# Patient Record
Sex: FEMALE | Race: OTHER
[De-identification: names, ages, dates, MRNs, and addresses within clinical notes are randomized per-mention and may not be internally consistent; named-entity substitution may affect disease eponyms.]

---

## 2017-10-27 ENCOUNTER — HOSPITAL ENCOUNTER (EMERGENCY)
Dept: HOSPITAL 72 - EMR | Age: 62
LOS: 1 days | Discharge: HOME | End: 2017-10-28
Payer: MEDICAID

## 2017-10-27 VITALS — DIASTOLIC BLOOD PRESSURE: 82 MMHG | SYSTOLIC BLOOD PRESSURE: 138 MMHG

## 2017-10-27 VITALS — HEIGHT: 61 IN | WEIGHT: 197 LBS | BODY MASS INDEX: 37.19 KG/M2

## 2017-10-27 DIAGNOSIS — R10.32: Primary | ICD-10-CM

## 2017-10-27 DIAGNOSIS — N20.0: ICD-10-CM

## 2017-10-27 DIAGNOSIS — Z98.1: ICD-10-CM

## 2017-10-27 DIAGNOSIS — F17.200: ICD-10-CM

## 2017-10-27 PROCEDURE — 83690 ASSAY OF LIPASE: CPT

## 2017-10-27 PROCEDURE — 80053 COMPREHEN METABOLIC PANEL: CPT

## 2017-10-27 PROCEDURE — 85025 COMPLETE CBC W/AUTO DIFF WBC: CPT

## 2017-10-27 PROCEDURE — 96361 HYDRATE IV INFUSION ADD-ON: CPT

## 2017-10-27 PROCEDURE — 96375 TX/PRO/DX INJ NEW DRUG ADDON: CPT

## 2017-10-27 PROCEDURE — 96374 THER/PROPH/DIAG INJ IV PUSH: CPT

## 2017-10-27 PROCEDURE — 36415 COLL VENOUS BLD VENIPUNCTURE: CPT

## 2017-10-27 PROCEDURE — 74176 CT ABD & PELVIS W/O CONTRAST: CPT

## 2017-10-27 PROCEDURE — 81003 URINALYSIS AUTO W/O SCOPE: CPT

## 2017-10-27 PROCEDURE — 99284 EMERGENCY DEPT VISIT MOD MDM: CPT

## 2017-10-28 VITALS — DIASTOLIC BLOOD PRESSURE: 82 MMHG | SYSTOLIC BLOOD PRESSURE: 138 MMHG

## 2017-10-28 LAB
ALBUMIN/GLOB SERPL: 1.2 {RATIO} (ref 1–2.7)
ALT SERPL-CCNC: 29 U/L (ref 12–78)
ANION GAP SERPL CALC-SCNC: 8 MMOL/L (ref 5–15)
APPEARANCE UR: CLEAR
AST SERPL-CCNC: 19 U/L (ref 15–37)
BASOPHILS NFR BLD AUTO: 1.4 % (ref 0–2)
CALCIUM SERPL-MCNC: 9.3 MG/DL (ref 8.5–10.1)
CHLORIDE SERPL-SCNC: 102 MMOL/L (ref 98–107)
CO2 SERPL-SCNC: 32 MMOL/L (ref 21–32)
CREAT SERPL-MCNC: 0.8 MG/DL (ref 0.55–1.3)
EOSINOPHIL NFR BLD AUTO: 2.2 % (ref 0–3)
ERYTHROCYTE [DISTWIDTH] IN BLOOD BY AUTOMATED COUNT: 13.4 % (ref 11.6–14.8)
GFR SERPLBLD BASED ON 1.73 SQ M-ARVRAT: > 60 ML/MIN (ref 60–?)
GLOBULIN SER-MCNC: 3.3 G/DL
KETONES UR QL STRIP: NEGATIVE
LEUKOCYTE ESTERASE UR QL STRIP: NEGATIVE
LIPASE SERPL-CCNC: 214 U/L (ref 73–393)
LYMPHOCYTES NFR BLD AUTO: 40.8 % (ref 20–45)
MCH RBC QN AUTO: 32.5 PG (ref 27–31)
MCHC RBC AUTO-ENTMCNC: 33.9 G/DL (ref 32–36)
MCV RBC AUTO: 96 FL (ref 80–99)
MONOCYTES NFR BLD AUTO: 7.5 % (ref 1–10)
NEUTROPHILS NFR BLD AUTO: 48 % (ref 45–75)
NITRITE UR QL STRIP: NEGATIVE
PH UR STRIP: 5 [PH] (ref 4.5–8)
PLATELET # BLD: 243 K/UL (ref 150–450)
PMV BLD AUTO: 7.9 FL (ref 6.5–10.1)
POTASSIUM SERPL-SCNC: 3.4 MMOL/L (ref 3.5–5.1)
PROT SERPL-MCNC: 7.3 G/DL (ref 6.4–8.2)
PROT UR QL STRIP: NEGATIVE
RBC # BLD AUTO: 4.31 M/UL (ref 4.2–5.4)
SODIUM SERPL-SCNC: 142 MMOL/L (ref 136–145)
SP GR UR STRIP: 1.02 (ref 1–1.03)
UROBILINOGEN UR-MCNC: NORMAL MG/DL (ref 0–1)
WBC # BLD AUTO: 12 K/UL (ref 4.8–10.8)

## 2017-10-28 NOTE — EMERGENCY ROOM REPORT
History of Present Illness


General


Chief Complaint:  Pain


Source:  Patient





Present Illness


HPI


Is a 62-year-old female with a history of back pain requiring surgery.  She 

said she is taking Norco.  She presents with chief complaint of left lower 

quadrant abdominal pain.  This is a recurring issue also.  She said that she 

was told that she has a hernia and is causing her pain.  She denies any nausea 

vomiting.  Pain for this episode occurred for 2 days now.  Left lower quadrant.

  Severe.  10 out of 10.  No nausea no vomiting.  No diarrhea.  Nothing made it 

better.  Movement made it worse.


Allergies:  


Coded Allergies:  


     No Known Allergies (Unverified , 9/25/12)





Patient History


Past Medical History:  see triage record, old chart reviewed


Past Surgical History:  other


Pertinent Family History:  none


Social History:  Reports: smoking


Pregnant Now:  No


Immunizations:  other


Reviewed Nursing Documentation:  PMH: Agreed, PSxH: Agreed





Nursing Documentation-PMH


Hx Neurological Problems:  Yes - BACK SURGERY





Review of Systems


Eye:  Denies: eye pain, blurred vision


ENT:  Denies: ear pain, nose congestion, throat swelling


Respiratory:  Denies: cough, shortness of breath


Cardiovascular:  Denies: chest pain, palpitations


Gastrointestinal:  Reports: abdominal pain, Denies: diarrhea, nausea, vomiting


Musculoskeletal:  Denies: back pain, joint pain


Skin:  Denies: rash


Neurological:  Denies: headache, numbness


Endocrine:  Denies: increased thirst, increased urine


Hematologic/Lymphatic:  Denies: easy bruising


All Other Systems:  negative except mentioned in HPI





Physical Exam





Vital Signs








  Date Time  Temp Pulse Resp B/P (MAP) Pulse Ox O2 Delivery O2 Flow Rate FiO2


 


10/27/17 23:42 97.9 73 18  97 Room Air  





vitals normal


Sp02 EP Interpretation:  reviewed, normal


General Appearance:  well appearing, no apparent distress, alert


Head:  normocephalic, atraumatic


Eyes:  bilateral eye PERRL, bilateral eye EOMI


ENT:  hearing grossly normal, normal pharynx


Neck:  full range of motion, supple, no meningismus


Respiratory:  chest non-tender, lungs clear, normal breath sounds


Cardiovascular #1:  regular rate, rhythm, no murmur


Gastrointestinal:  normal bowel sounds, no mass, no organomegaly, no bruit, non-

distended, tenderness - Left lower quadrant


Musculoskeletal:  back normal, gait/station normal, normal range of motion


Psychiatric:  mood/affect normal


Skin:  warm/dry





Medical Decision Making


Diagnostic Impression:  


 Primary Impression:  


 Abdominal pain


 Qualified Codes:  R10.32 - Left lower quadrant pain


ER Course


Patient presents With abdominal pain.  No acute abdomen.  No evidence of 

diverticulitis.  No evidence of appendicitis.  There is no hernia in the left 

lower or inguinal area.  Patient is better now.  We'll discharge home.





Last Vital Signs








  Date Time  Temp Pulse Resp B/P (MAP) Pulse Ox O2 Delivery O2 Flow Rate FiO2


 


10/28/17 01:06 97.8       


 


10/27/17 23:42  73 18  97 Room Air  








Status:  improved


Disposition:  HOME, SELF-CARE


Condition:  Stable


Scripts


Ibuprofen* (MOTRIN*) 600 Mg Tablet


600 MG ORAL THREE TIMES A DAY, #30 TAB 0 Refills


   Prov: CHRISTI MCLEAN M.D.         10/28/17


Referrals:  


NON PHYSICIAN (PCP)


Patient Instructions:  PAIN, Uncertain Cause (Acute)





Additional Instructions:  


Followup your Dr. in 7 days.  Return if symptom worsen.  You may benefit from 

referral to see a gastroenterologist for colonoscopy one has been done recently.











CHRISTI MCLEAN M.D. Oct 28, 2017 01:51

## 2017-10-28 NOTE — DIAGNOSTIC IMAGING REPORT
Indication: Abdominal pain



Technique: Spiral acquisitions obtained through the abdomen and pelvis. No oral

contrast utilized, per emergency room physician request No IV contrast utilized,

per referring physician request.. Multiplanar reconstructions were generated. Total

dose length product 1578 mGycm. CTDIvol(s) 32 mGy. Dose reduction achieved 

using

automated exposure control





Comparison: None



Findings: The appendix is not definitely identified, but no findings to suggest

acute appendicitis are evident. No evidence of diverticulosis or diverticulitis. No

small bowel distention. No free or loculated intraperitoneal air or fluid. 

Distal

esophagus, stomach, duodenum are unremarkable. There are midline fascial wire

sutures. There is a tiny fat-containing umbilical hernia



Lack of IV contrast limits assessment of solid organs. The liver, gallbladder, 

bile

ducts, pancreas, spleen, are unremarkable. The left kidney demonstrates a 15 mm

cyst. The right kidney demonstrates a 3 mm nonobstructive intrarenal calyceal

calculus. No hydronephrosis or hydroureter, or ureteral calculi. Unremarkable

bladder. Uterus and adnexal structures are unremarkable. No pelvic mass or

adenopathy. No retroperitoneal or mesenteric mass or adenopathy. There is evidence

of extensive prior lower spinal surgery. The included lung bases are clear.



Impression: No definite acute abnormality



Nonobstructing right kidney intrarenal calyceal calculus



Evidence of prior spinal fusion surgery



Other findings as noted, including left renal cyst, tiny fat-containing umbilical

hernia



This agrees with the preliminary interpretation provided overnight by 

[facility]

teleradiology service.



The CT scanner at [facility] is accredited by the [employer] College 

of

Radiology and the scans are performed using protocols designed to limit 

radiation

exposure to as low as reasonably achievable to attain images of sufficient

resolution adequate for diagnostic evaluation.

## 2017-11-06 ENCOUNTER — HOSPITAL ENCOUNTER (EMERGENCY)
Dept: HOSPITAL 72 - EMR | Age: 62
Discharge: HOME | End: 2017-11-06
Payer: MEDICAID

## 2017-11-06 VITALS — DIASTOLIC BLOOD PRESSURE: 62 MMHG | SYSTOLIC BLOOD PRESSURE: 123 MMHG

## 2017-11-06 VITALS — WEIGHT: 201 LBS | BODY MASS INDEX: 37.95 KG/M2 | HEIGHT: 61 IN

## 2017-11-06 DIAGNOSIS — Z98.890: ICD-10-CM

## 2017-11-06 DIAGNOSIS — R10.32: Primary | ICD-10-CM

## 2017-11-06 PROCEDURE — 99282 EMERGENCY DEPT VISIT SF MDM: CPT

## 2017-11-06 NOTE — EMERGENCY ROOM REPORT
History of Present Illness


General


Chief Complaint:  Abdominal Pain


Source:  Patient, Family Member, Medical Record





Present Illness


HPI


This is a 62-year-old female presents with chief complaint of left lower 

quadrant pain.  Has been ongoing for several months.  I saw her about a week 

ago and blood work was unremarkable.  CT scan was unremarkable.  She said that 

she has a hernia in the left lower quadrant.  She was told this at [place] 6 

months ago.  I checked the records and there was no such thing.  She present to 

Dr. Landon's office today stating that she has a hernia and she is in severe 

pain.  He wrote prescription for pain medication refer her to a surgeon.  He 

also sent her here for incarcerated hernia.  I spoke with him regarding this.  

He is basing this on her account.


Allergies:  


Coded Allergies:  


     No Known Allergies (Unverified , 9/25/12)





Patient History


Past Medical History:  see triage record, old chart reviewed


Past Surgical History:  other


Pertinent Family History:  none


Social History:  Denies: smoking


Pregnant Now:  No


Immunizations:  other


Reviewed Nursing Documentation:  PMH: Agreed, PSxH: Agreed





Nursing Documentation-PMH


Hx Neurological Problems:  Yes - BACK SURGERY





Review of Systems


Eye:  Denies: eye pain, blurred vision


ENT:  Denies: ear pain, nose congestion, throat swelling


Respiratory:  Denies: cough, shortness of breath


Cardiovascular:  Denies: chest pain, palpitations


Gastrointestinal:  Reports: abdominal pain, Denies: diarrhea, nausea, vomiting


Musculoskeletal:  Denies: back pain, joint pain


Skin:  Denies: rash


Neurological:  Denies: headache, numbness


Endocrine:  Denies: increased thirst, increased urine


Hematologic/Lymphatic:  Denies: easy bruising


All Other Systems:  negative except mentioned in HPI





Physical Exam





Vital Signs








  Date Time  Temp Pulse Resp B/P (MAP) Pulse Ox O2 Delivery O2 Flow Rate FiO2


 


11/6/17 21:54 98.2 87 16 135/72 96 Room Air  





vitals normal


Sp02 EP Interpretation:  reviewed, normal


General Appearance:  well appearing, no apparent distress, alert


Head:  normocephalic, atraumatic


Eyes:  bilateral eye PERRL, bilateral eye EOMI


ENT:  hearing grossly normal, normal pharynx


Neck:  full range of motion, supple, no meningismus


Respiratory:  chest non-tender, lungs clear, normal breath sounds


Cardiovascular #1:  regular rate, rhythm, no murmur


Gastrointestinal:  normal bowel sounds, no mass, no organomegaly, no bruit, non-

distended, tenderness - Diffuse but mostly left lower quadrant


Musculoskeletal:  back normal, gait/station normal, normal range of motion


Neurologic:  alert, oriented x3


Psychiatric:  other - Patient was very histrionic moaning and crying.


Skin:  warm/dry





Medical Decision Making


Diagnostic Impression:  


 Primary Impression:  


 Abdominal pain


 Qualified Codes:  R10.32 - Left lower quadrant pain


ER Course


Patient present with abdominal pain.  She been here several time for abdominal 

pain.  6 months ago there was no CT scan done.  No one diagnosed with hernia.  

I did a CT scan on her last visit which is about over a week ago.  There was a 

small umbilical hernia that containing only fat.  There is no hernia or issue 

in the left lower quadrant.  This is the first time that Dr. Landon saw 

patient.  He was basing his diagnosis of incarcerated hernia on her history.  

He already refer her to surgeon.  Now she is calm and no longer in pain.  I see 

no evidence of an acute abdomen.  No evidence of hernia.  No evidence of 

obstruction.  We'll discharge home.





Last Vital Signs








  Date Time  Temp Pulse Resp B/P (MAP) Pulse Ox O2 Delivery O2 Flow Rate FiO2


 


11/6/17 22:24 98.2 79 16 123/62 96 Room Air  








Status:  improved


Disposition:  HOME, SELF-CARE


Condition:  Stable


Patient Instructions:  Abdominal Pain, Adult





Additional Instructions:  


Followup with your DrCallum in 7 days.  You have already been referred to see a 

surgeon.  You do not have a hernia in the left lower quadrant.  Return if 

symptom worsen.











CHRISTI MCLEAN M.D. Nov 6, 2017 22:41

## 2017-11-06 NOTE — EMERGENCY ROOM REPORT
History of Present Illness


General


Chief Complaint:  Abdominal Pain


Source:  Patient, Family Member, Medical Record





Present Illness


HPI


This is a 62-year-old female presents with chief complaint of left lower 

quadrant pain.  Has been ongoing for several months.  I saw her about a week 

ago and blood work was unremarkable.  CT scan was unremarkable.  She said that 

she has a hernia in the left lower quadrant.  She was told this at [facility] 6 

months ago.  I checked the records and there was no such thing.  She present to 

Dr. Landon's office today stating that she has a hernia and she is in severe 

pain.  He wrote prescription for pain medication refer her to a surgeon.  He 

also sent her here for incarcerated hernia.  I spoke with him regarding this.  

He is basing this on her account.


Allergies:  


Coded Allergies:  


     No Known Allergies (Unverified , 9/25/12)





Patient History


Past Medical History:  see triage record, old chart reviewed


Past Surgical History:  other


Pertinent Family History:  none


Social History:  Denies: smoking


Pregnant Now:  No


Immunizations:  other


Reviewed Nursing Documentation:  PMH: Agreed, PSxH: Agreed





Nursing Documentation-PMH


Hx Neurological Problems:  Yes - BACK SURGERY





Review of Systems


Eye:  Denies: eye pain, blurred vision


ENT:  Denies: ear pain, nose congestion, throat swelling


Respiratory:  Denies: cough, shortness of breath


Cardiovascular:  Denies: chest pain, palpitations


Gastrointestinal:  Reports: abdominal pain, Denies: diarrhea, nausea, vomiting


Musculoskeletal:  Denies: back pain, joint pain


Skin:  Denies: rash


Neurological:  Denies: headache, numbness


Endocrine:  Denies: increased thirst, increased urine


Hematologic/Lymphatic:  Denies: easy bruising


All Other Systems:  negative except mentioned in HPI





Physical Exam





Vital Signs








  Date Time  Temp Pulse Resp B/P (MAP) Pulse Ox O2 Delivery O2 Flow Rate FiO2


 


11/6/17 21:54 98.2 87 16 135/72 96 Room Air  





vitals normal


Sp02 EP Interpretation:  reviewed, normal


General Appearance:  well appearing, no apparent distress, alert


Head:  normocephalic, atraumatic


Eyes:  bilateral eye PERRL, bilateral eye EOMI


ENT:  hearing grossly normal, normal pharynx


Neck:  full range of motion, supple, no meningismus


Respiratory:  chest non-tender, lungs clear, normal breath sounds


Cardiovascular #1:  regular rate, rhythm, no murmur


Gastrointestinal:  normal bowel sounds, no mass, no organomegaly, no bruit, non-

distended, tenderness - Diffuse but mostly left lower quadrant


Musculoskeletal:  back normal, gait/station normal, normal range of motion


Neurologic:  alert, oriented x3


Psychiatric:  other - Patient was very histrionic moaning and crying.


Skin:  warm/dry





Medical Decision Making


Diagnostic Impression:  


 Primary Impression:  


 Abdominal pain


 Qualified Codes:  R10.32 - Left lower quadrant pain


ER Course


Patient present with abdominal pain.  She been here several time for abdominal 

pain.  6 months ago there was no CT scan done.  No one diagnosed with hernia.  

I did a CT scan on her last visit which is about over a week ago.  There was a 

small umbilical hernia that containing only fat.  There is no hernia or issue 

in the left lower quadrant.  This is the first time that Dr. Landon saw 

patient.  He was basing his diagnosis of incarcerated hernia on her history.  

He already refer her to surgeon.  Now she is calm and no longer in pain.  I see 

no evidence of an acute abdomen.  No evidence of hernia.  No evidence of 

obstruction.  We'll discharge home.





Last Vital Signs








  Date Time  Temp Pulse Resp B/P (MAP) Pulse Ox O2 Delivery O2 Flow Rate FiO2


 


11/6/17 22:24 98.2 79 16 123/62 96 Room Air  








Status:  improved


Disposition:  HOME, SELF-CARE


Condition:  Stable


Patient Instructions:  Abdominal Pain, Adult





Additional Instructions:  


Followup with your DrCallum in 7 days.  You have already been referred to see a 

surgeon.  You do not have a hernia in the left lower quadrant.  Return if 

symptom worsen.











CHRISTI MCLEAN M.D. Nov 6, 2017 22:41

## 2017-11-06 NOTE — EMERGENCY ROOM REPORT
History of Present Illness


General


Chief Complaint:  Abdominal Pain


Source:  Patient, Family Member, Medical Record





Present Illness


HPI


This is a 62-year-old female presents with chief complaint of left lower 

quadrant pain.  Has been ongoing for several months.  I saw her about a week 

ago and blood work was unremarkable.  CT scan was unremarkable.  She said that 

she has a hernia in the left lower quadrant.  She was told this at [place] 6 

months ago.  I checked the records and there was no such thing.  She present to 

Dr. Landon's office today stating that she has a hernia and she is in severe 

pain.  He wrote prescription for pain medication refer her to a surgeon.  He 

also sent her here for incarcerated hernia.  I spoke with him regarding this.  

He is basing this on her account.


Allergies:  


Coded Allergies:  


     No Known Allergies (Unverified , 9/25/12)





Patient History


Past Medical History:  see triage record, old chart reviewed


Past Surgical History:  other


Pertinent Family History:  none


Social History:  Denies: smoking


Pregnant Now:  No


Immunizations:  other


Reviewed Nursing Documentation:  PMH: Agreed, PSxH: Agreed





Nursing Documentation-PMH


Hx Neurological Problems:  Yes - BACK SURGERY





Review of Systems


Eye:  Denies: eye pain, blurred vision


ENT:  Denies: ear pain, nose congestion, throat swelling


Respiratory:  Denies: cough, shortness of breath


Cardiovascular:  Denies: chest pain, palpitations


Gastrointestinal:  Reports: abdominal pain, Denies: diarrhea, nausea, vomiting


Musculoskeletal:  Denies: back pain, joint pain


Skin:  Denies: rash


Neurological:  Denies: headache, numbness


Endocrine:  Denies: increased thirst, increased urine


Hematologic/Lymphatic:  Denies: easy bruising


All Other Systems:  negative except mentioned in HPI





Physical Exam





Vital Signs








  Date Time  Temp Pulse Resp B/P (MAP) Pulse Ox O2 Delivery O2 Flow Rate FiO2


 


11/6/17 21:54 98.2 87 16 135/72 96 Room Air  





vitals normal


Sp02 EP Interpretation:  reviewed, normal


General Appearance:  well appearing, no apparent distress, alert


Head:  normocephalic, atraumatic


Eyes:  bilateral eye PERRL, bilateral eye EOMI


ENT:  hearing grossly normal, normal pharynx


Neck:  full range of motion, supple, no meningismus


Respiratory:  chest non-tender, lungs clear, normal breath sounds


Cardiovascular #1:  regular rate, rhythm, no murmur


Gastrointestinal:  normal bowel sounds, no mass, no organomegaly, no bruit, non-

distended, tenderness - Diffuse but mostly left lower quadrant


Musculoskeletal:  back normal, gait/station normal, normal range of motion


Neurologic:  alert, oriented x3


Psychiatric:  other - Patient was very histrionic moaning and crying.


Skin:  warm/dry





Medical Decision Making


Diagnostic Impression:  


 Primary Impression:  


 Abdominal pain


 Qualified Codes:  R10.32 - Left lower quadrant pain


ER Course


Patient present with abdominal pain.  She been here several time for abdominal 

pain.  6 months ago there was no CT scan done.  No one diagnosed with hernia.  

I did a CT scan on her last visit which is about over a week ago.  There was a 

small umbilical hernia that containing only fat.  There is no hernia or issue 

in the left lower quadrant.  This is the first time that Dr. Landon saw 

patient.  He was basing his diagnosis of incarcerated hernia on her history.  

He already refer her to surgeon.  Now she is calm and no longer in pain.  I see 

no evidence of an acute abdomen.  No evidence of hernia.  No evidence of 

obstruction.  We'll discharge home.





Last Vital Signs








  Date Time  Temp Pulse Resp B/P (MAP) Pulse Ox O2 Delivery O2 Flow Rate FiO2


 


11/6/17 22:24 98.2 79 16 123/62 96 Room Air  








Status:  improved


Disposition:  HOME, SELF-CARE


Condition:  Stable


Patient Instructions:  Abdominal Pain, Adult





Additional Instructions:  


Followup with your DrCallum in 7 days.  You have already been referred to see a 

surgeon.  You do not have a hernia in the left lower quadrant.  Return if 

symptom worsen.











CHRISTI MCLEAN M.D. Nov 6, 2017 22:41

## 2019-07-17 ENCOUNTER — HOSPITAL ENCOUNTER (EMERGENCY)
Dept: HOSPITAL 72 - EMR | Age: 64
Discharge: HOME | End: 2019-07-17
Payer: MEDICAID

## 2019-07-17 VITALS — WEIGHT: 190 LBS | BODY MASS INDEX: 35.87 KG/M2 | HEIGHT: 61 IN

## 2019-07-17 VITALS — DIASTOLIC BLOOD PRESSURE: 71 MMHG | SYSTOLIC BLOOD PRESSURE: 114 MMHG

## 2019-07-17 DIAGNOSIS — M54.5: Primary | ICD-10-CM

## 2019-07-17 DIAGNOSIS — E66.9: ICD-10-CM

## 2019-07-17 DIAGNOSIS — F17.200: ICD-10-CM

## 2019-07-17 DIAGNOSIS — G89.29: ICD-10-CM

## 2019-07-17 PROCEDURE — 96372 THER/PROPH/DIAG INJ SC/IM: CPT

## 2019-07-17 PROCEDURE — 99283 EMERGENCY DEPT VISIT LOW MDM: CPT

## 2019-07-17 NOTE — EMERGENCY ROOM REPORT
History of Present Illness


General


Chief Complaint:  Back Injury


Source:  Patient





Present Illness


HPI


This is a 64-year-old female with history of chronic pain.  She has previous 

back surgery.  She presents with chief complaint of low back pain.  She claimed 

that she slipped and fell in the kitchen 5 days ago.  She says she got an MRI 

of her back after her fall.  She denies any fever or chills.  Pain is 10 out of 

10.  Norco is not helping her.  No syncope.  No incontinence of bowel or urine.

  No radiation of her pain.  Denies any other complaint.


Allergies:  


Coded Allergies:  


     No Known Allergies (Unverified , 9/25/12)





Patient History


Past Medical History:  see triage record, old chart reviewed


Past Surgical History:  other


Pertinent Family History:  none


Social History:  Reports: smoking


Pregnant Now:  No


Immunizations:  other


Reviewed Nursing Documentation:  PMH: Agreed; PSxH: Agreed





Nursing Documentation-PMH


Past Medical History:  No Stated History


Hx Neurological Problems:  Yes - BACK SURGERY





Review of Systems


Eye:  Denies: eye pain, blurred vision


ENT:  Denies: ear pain, nose congestion, throat swelling


Respiratory:  Denies: cough, shortness of breath


Cardiovascular:  Denies: chest pain, palpitations


Gastrointestinal:  Denies: abdominal pain, diarrhea, nausea, vomiting


Musculoskeletal:  Reports: back pain; Denies: joint pain


Skin:  Denies: rash


Neurological:  Denies: headache, numbness


Endocrine:  Denies: increased thirst, increased urine


Hematologic/Lymphatic:  Denies: easy bruising


All Other Systems:  negative except mentioned in HPI





Physical Exam





Vital Signs








  Date Time  Temp Pulse Resp B/P (MAP) Pulse Ox O2 Delivery O2 Flow Rate FiO2


 


7/17/19 00:54 97.5 62 18 114/71 (85) 96 Room Air  





Vitals normal


Sp02 EP Interpretation:  reviewed, normal


General Appearance:  well appearing, no apparent distress, alert, obese


Head:  normocephalic, atraumatic


Eyes:  bilateral eye PERRL, bilateral eye EOMI


ENT:  hearing grossly normal, normal pharynx


Neck:  full range of motion, supple, no meningismus


Respiratory:  chest non-tender, lungs clear, normal breath sounds


Cardiovascular #1:  regular rate, rhythm, no murmur


Gastrointestinal:  normal bowel sounds, non tender, no mass, no organomegaly, 

no bruit, non-distended


Musculoskeletal:  back normal - Postsurgical scar.  No redness.  No step-off., 

gait/station normal, normal range of motion


Psychiatric:  mood/affect normal





Medical Decision Making


Diagnostic Impression:  


 Primary Impression:  


 Acute exacerbation of chronic low back pain


ER Course


She with exacerbation of her chronic lower back pain.  He does not add up.  Her 

MRI shoulder was done on July 3.  When I point out to patient that she fell 5 

days ago based on her history and she said that she had MRI done afterward.  I 

told her the date does not match up.  She then changed her story to state that 

she had MRI first and then fell a week later.  No evidence of cauda equina 

syndrome, spinal epidural abscess or neoplastic process.  She is getting pain 

medication, Soma and alprazolam by Dr. Landon.  I see no need for more 

prescription.





Last Vital Signs








  Date Time  Temp Pulse Resp B/P (MAP) Pulse Ox O2 Delivery O2 Flow Rate FiO2


 


7/17/19 00:54 97.5 62 18 114/71 (85) 96 Room Air  








Status:  improved


Disposition:  HOME, SELF-CARE


Condition:  Stable


Patient Instructions:  Back Pain, Adult





Additional Instructions:  


Follow-up with your doctor in 7 days.  Return if symptoms worsen.  You may need 

to see a pain specialist.











Armando Griffith MD Jul 17, 2019 01:23

## 2019-07-17 NOTE — NUR
ED Nurse Note:



Pt from home with c/o left lower back and arm pain s/p fall, pt hs had x 3 
surgeries to area and on pain management, pt denies k.o or any other injuries 
or complaints, pt sitting in wheelchair,  is present also.

## 2019-09-16 ENCOUNTER — HOSPITAL ENCOUNTER (EMERGENCY)
Dept: HOSPITAL 72 - EMR | Age: 64
LOS: 1 days | Discharge: HOME | End: 2019-09-17
Payer: MEDICAID

## 2019-09-16 VITALS — HEIGHT: 61 IN | WEIGHT: 200 LBS | BODY MASS INDEX: 37.76 KG/M2

## 2019-09-16 DIAGNOSIS — F17.200: ICD-10-CM

## 2019-09-16 DIAGNOSIS — J44.9: ICD-10-CM

## 2019-09-16 DIAGNOSIS — R07.9: Primary | ICD-10-CM

## 2019-09-16 DIAGNOSIS — R51: ICD-10-CM

## 2019-09-16 DIAGNOSIS — F12.10: ICD-10-CM

## 2019-09-16 PROCEDURE — 85610 PROTHROMBIN TIME: CPT

## 2019-09-16 PROCEDURE — 71045 X-RAY EXAM CHEST 1 VIEW: CPT

## 2019-09-16 PROCEDURE — 96374 THER/PROPH/DIAG INJ IV PUSH: CPT

## 2019-09-16 PROCEDURE — 85730 THROMBOPLASTIN TIME PARTIAL: CPT

## 2019-09-16 PROCEDURE — 80329 ANALGESICS NON-OPIOID 1 OR 2: CPT

## 2019-09-16 PROCEDURE — 80053 COMPREHEN METABOLIC PANEL: CPT

## 2019-09-16 PROCEDURE — 84484 ASSAY OF TROPONIN QUANT: CPT

## 2019-09-16 PROCEDURE — 81003 URINALYSIS AUTO W/O SCOPE: CPT

## 2019-09-16 PROCEDURE — 80307 DRUG TEST PRSMV CHEM ANLYZR: CPT

## 2019-09-16 PROCEDURE — 85025 COMPLETE CBC W/AUTO DIFF WBC: CPT

## 2019-09-16 PROCEDURE — 99284 EMERGENCY DEPT VISIT MOD MDM: CPT

## 2019-09-16 PROCEDURE — 82550 ASSAY OF CK (CPK): CPT

## 2019-09-16 PROCEDURE — 36415 COLL VENOUS BLD VENIPUNCTURE: CPT

## 2019-09-16 PROCEDURE — 93005 ELECTROCARDIOGRAM TRACING: CPT

## 2019-09-16 PROCEDURE — 83880 ASSAY OF NATRIURETIC PEPTIDE: CPT

## 2019-09-17 VITALS — DIASTOLIC BLOOD PRESSURE: 83 MMHG | SYSTOLIC BLOOD PRESSURE: 130 MMHG

## 2019-09-17 VITALS — SYSTOLIC BLOOD PRESSURE: 134 MMHG | DIASTOLIC BLOOD PRESSURE: 77 MMHG

## 2019-09-17 LAB
ADD MANUAL DIFF: NO
ALBUMIN SERPL-MCNC: 3.6 G/DL (ref 3.4–5)
ALBUMIN/GLOB SERPL: 1.2 {RATIO} (ref 1–2.7)
ALP SERPL-CCNC: 102 U/L (ref 46–116)
ALT SERPL-CCNC: 19 U/L (ref 12–78)
ANION GAP SERPL CALC-SCNC: 8 MMOL/L (ref 5–15)
APPEARANCE UR: CLEAR
APTT BLD: 27 SEC (ref 23–33)
APTT PPP: (no result) S
AST SERPL-CCNC: 17 U/L (ref 15–37)
BASOPHILS NFR BLD AUTO: 1.4 % (ref 0–2)
BILIRUB SERPL-MCNC: 0.4 MG/DL (ref 0.2–1)
BUN SERPL-MCNC: 22 MG/DL (ref 7–18)
CALCIUM SERPL-MCNC: 8.8 MG/DL (ref 8.5–10.1)
CHLORIDE SERPL-SCNC: 107 MMOL/L (ref 98–107)
CK SERPL-CCNC: 99 U/L (ref 26–308)
CO2 SERPL-SCNC: 28 MMOL/L (ref 21–32)
CREAT SERPL-MCNC: 0.7 MG/DL (ref 0.55–1.3)
EOSINOPHIL NFR BLD AUTO: 3.2 % (ref 0–3)
ERYTHROCYTE [DISTWIDTH] IN BLOOD BY AUTOMATED COUNT: 12.2 % (ref 11.6–14.8)
GLOBULIN SER-MCNC: 3 G/DL
GLUCOSE UR STRIP-MCNC: NEGATIVE MG/DL
HCT VFR BLD CALC: 39.7 % (ref 37–47)
HGB BLD-MCNC: 13.1 G/DL (ref 12–16)
INR PPP: 1 (ref 0.9–1.1)
KETONES UR QL STRIP: NEGATIVE
LEUKOCYTE ESTERASE UR QL STRIP: NEGATIVE
LYMPHOCYTES NFR BLD AUTO: 42.7 % (ref 20–45)
MCV RBC AUTO: 95 FL (ref 80–99)
MONOCYTES NFR BLD AUTO: 5.8 % (ref 1–10)
NEUTROPHILS NFR BLD AUTO: 46.9 % (ref 45–75)
NITRITE UR QL STRIP: NEGATIVE
PH UR STRIP: 6 [PH] (ref 4.5–8)
PLATELET # BLD: 189 K/UL (ref 150–450)
POTASSIUM SERPL-SCNC: 3.7 MMOL/L (ref 3.5–5.1)
PROT UR QL STRIP: NEGATIVE
RBC # BLD AUTO: 4.18 M/UL (ref 4.2–5.4)
SODIUM SERPL-SCNC: 143 MMOL/L (ref 136–145)
SP GR UR STRIP: 1.02 (ref 1–1.03)
UROBILINOGEN UR-MCNC: NORMAL MG/DL (ref 0–1)
WBC # BLD AUTO: 8 K/UL (ref 4.8–10.8)

## 2019-09-17 NOTE — DIAGNOSTIC IMAGING REPORT
Indication: Dyspnea

 

Comparison:  None

 

A single view chest radiograph was obtained.

 

Findings:

 

Cardiomediastinal appearance is within normal limits for age. The lungs are clear.

Pulmonary vascularity is appropriate. The diaphragmatic contour is smooth and

costophrenic angles are sharp. No pleural effusions are identified. The bones are

osteopenic.

 

Impression: No acute findings

## 2019-09-17 NOTE — EMERGENCY ROOM REPORT
History of Present Illness


General


Chief Complaint:  Chest Pain


Source:  Patient





Present Illness


HPI


Patient was in her kitchen and started having a severe headache.  Then she 

started having chest pressure rating down towards her left arm.  Her daughter 

checked her blood pressure was elevated at that time.  She is doing slightly 

better at this moment but she is concerned whether she is having a heart 

attack.  Risk factors smoking.  She also complained about nausea at that time 

but did not vomit.  She actually states the headache is been going on for 2 

days.  She does have headaches like this occasionally.  Headache was gradual 

onset.  She denies fevers chills, productive cough, sore throat, blood thinners

, oncologic problems, IV drug use and trauma.  Headache is throbbing and rated 

severe.  The chest pain is throbbing also radiating towards her shoulder and 

also rated as severe but poorly characterized.  She feels it somewhat sharp.  

Also somewhat positional.





No palpitations, diarrhea, dysuria, abdominal pain, shortness of breath, joint 

pain, rashes, depression, anxiety, visual changes.





Patient has a history of COPD.  Denies wheezing at this time.  She also denies 

productive cough.





Patient has sciatica on the right-hand side.  In the past she was diagnosed 

with opiate dependence and narcotic seeking behavior.


Allergies:  


Coded Allergies:  


     No Known Allergies (Unverified , 9/25/12)





Patient History


Past Medical History:  see triage record


Social History:  Reports: smoking, drug use - THC


Social History Narrative


From home


Last Menstrual Period:  na


Reviewed Nursing Documentation:  PMH: Agreed; PSxH: Agreed





Nursing Documentation-PMH


Hx Neurological Problems:  Yes - BACK SURGERY





Review of Systems


All Other Systems:  negative except mentioned in HPI





Physical Exam





Vital Signs








  Date Time  Temp Pulse Resp B/P (MAP) Pulse Ox O2 Delivery O2 Flow Rate FiO2


 


9/16/19 23:47 97.2 76 19 128/76 (93) 100 Room Air  








Sp02 EP Interpretation:  reviewed, normal


General Appearance:  well appearing, no apparent distress, GCS 15, non-toxic


Head:  normocephalic, atraumatic


Eyes:  bilateral eye normal inspection, bilateral eye PERRL, bilateral eye EOMI


ENT:  moist mucus membranes


Neck:  supple


Respiratory:  lungs clear, normal breath sounds, other - Some left-sided chest 

discomfort


Cardiovascular #1:  regular rate, rhythm, no edema


Cardiovascular #2:  2+ radial (R), 2+ radial (L)


Gastrointestinal:  normal inspection, normal bowel sounds, non tender, no mass, 

non-distended, overweight


Musculoskeletal:  back normal, gait/station normal, normal range of motion, no 

calf tenderness


Neurologic:  alert, oriented x3, CNs III-XII nml as tested, motor strength/tone 

normal, DTRs symmetric, sensory intact, cerebellar normal, normal gait, speech 

normal


Psychiatric:  mood/affect normal - Slightly anxious


Skin:  no rash





Medical Decision Making


Diagnostic Impression:  


 Primary Impression:  


 Chest pain


 Qualified Codes:  R07.9 - Chest pain, unspecified


 Additional Impression:  


 Head ache


 Qualified Codes:  R51 - Headache


ER Course


Patient presents with headache for 2 to 3 days chest pain that began this 

evening.  Differential includes acute myocardial infarction, chest wall pain, 

pulmonary embolus, anxiety, migraine, tension headache, occult infection, 

hypertensive urgency amongst others.  Clinically the patient does not have a 

pulmonary embolus.  Patient evaluated with EKG, chest x-ray and labs.  Based on 

her symptoms of her headache and neurologic exam CT the head is not indicated 

at this time.  The patient is treated with a dose of aspirin and Norco.  

Patient is placed on cardiac monitor.  Blood pressure is normal at this time.





EKG normal sinus rhythm normal EKG.  Chest x-ray no infiltrates normal heart 

size.  Labs remarkable for negative troponin.





Patient improved with treatment.





Discussed etiologies of headache and chest pain.  Discussed the need for follow-

up with her doctor.  She says she has an appointment today.  Patient's 

requesting more treatment for pain.  Norco repeated. Discussed with family 

also.  No medical emergency at this time.  Discussed with patient importance of 

smoking cessation and to discuss this with her doctor.





Patient stable for outpatient observation and treatment.





Laboratory Tests








Test


  9/17/19


00:30 9/17/19


02:32


 


White Blood Count


  8.0 K/UL


(4.8-10.8) 


 


 


Red Blood Count


  4.18 M/UL


(4.20-5.40)  L 


 


 


Hemoglobin


  13.1 G/DL


(12.0-16.0) 


 


 


Hematocrit


  39.7 %


(37.0-47.0) 


 


 


Mean Corpuscular Volume 95 FL (80-99)   


 


Mean Corpuscular Hemoglobin


  31.4 PG


(27.0-31.0)  H 


 


 


Mean Corpuscular Hemoglobin


Concent 33.1 G/DL


(32.0-36.0) 


 


 


Red Cell Distribution Width


  12.2 %


(11.6-14.8) 


 


 


Platelet Count


  189 K/UL


(150-450) 


 


 


Mean Platelet Volume


  7.9 FL


(6.5-10.1) 


 


 


Neutrophils (%) (Auto)


  46.9 %


(45.0-75.0) 


 


 


Lymphocytes (%) (Auto)


  42.7 %


(20.0-45.0) 


 


 


Monocytes (%) (Auto)


  5.8 %


(1.0-10.0) 


 


 


Eosinophils (%) (Auto)


  3.2 %


(0.0-3.0)  H 


 


 


Basophils (%) (Auto)


  1.4 %


(0.0-2.0) 


 


 


Prothrombin Time


  10.2 SEC


(9.30-11.50) 


 


 


Prothrombin Time INR 1.0 (0.9-1.1)   


 


PTT


  27 SEC (23-33)


  


 


 


Sodium Level


  143 MMOL/L


(136-145) 


 


 


Potassium Level


  3.7 MMOL/L


(3.5-5.1) 


 


 


Chloride Level


  107 MMOL/L


() 


 


 


Carbon Dioxide Level


  28 MMOL/L


(21-32) 


 


 


Anion Gap


  8 mmol/L


(5-15) 


 


 


Blood Urea Nitrogen


  22 mg/dL


(7-18)  H 


 


 


Creatinine


  0.7 MG/DL


(0.55-1.30) 


 


 


Estimate Glomerular


Filtration Rate > 60 mL/min


(>60) 


 


 


Glucose Level


  118 MG/DL


()  H 


 


 


Calcium Level


  8.8 MG/DL


(8.5-10.1) 


 


 


Total Bilirubin


  0.4 MG/DL


(0.2-1.0) 


 


 


Aspartate Amino Transferase


(AST) 17 U/L (15-37)


  


 


 


Alanine Aminotransferase (ALT)


  19 U/L (12-78)


  


 


 


Alkaline Phosphatase


  102 U/L


() 


 


 


Total Creatine Kinase


  99 U/L


() 


 


 


Troponin I


  0.000 ng/mL


(0.000-0.056) 


 


 


Pro-B-Type Natriuretic Peptide


  18 pg/mL


(0-125) 


 


 


Total Protein


  6.6 G/DL


(6.4-8.2) 


 


 


Albumin


  3.6 G/DL


(3.4-5.0) 


 


 


Globulin 3.0 g/dL   


 


Albumin/Globulin Ratio 1.2 (1.0-2.7)   


 


Serum Alcohol < 3 mg/dL   


 


Urine Color  Pending  


 


Urine Appearance  Pending  


 


Urine pH  Pending  


 


Urine Specific Gravity  Pending  


 


Urine Protein  Pending  


 


Urine Glucose (UA)  Pending  


 


Urine Ketones  Pending  


 


Urine Blood  Pending  


 


Urine Nitrite  Pending  


 


Urine Bilirubin  Pending  


 


Urine Urobilinogen  Pending  


 


Urine Leukocyte Esterase  Pending  


 


Urine Opiates Screen  Pending  


 


Urine Barbiturates Screen  Pending  


 


Phencyclidine (PCP) Screen  Pending  


 


Urine Amphetamines Screen  Pending  


 


Urine Benzodiazepines Screen  Pending  


 


Urine Cocaine Screen  Pending  


 


Urine Marijuana (THC) Screen  Pending  








EKG Diagnostic Results


Rate:  normal


Rhythm:  NSR


ST Segments:  no acute changes





Rhythm Strip Diag. Results


EP Interpretation:  yes


Rhythm:  NSR, no PVC's, no ectopy





Chest X-Ray Diagnostic Results


Chest X-Ray Diagnostic Results :  


   Chest X-Ray Ordered:  Yes


   # of Views/Limited/Complete:  1 View


   Indication:  Chest Pain


   EP Interpretation:  Yes


   Interpretation:  no consolidation, no effusion, no pneumothorax


   Impression:  No acute disease


   Electronically Signed by:  Electronically signed by Jorge Robb MD





Last Vital Signs








  Date Time  Temp Pulse Resp B/P (MAP) Pulse Ox O2 Delivery O2 Flow Rate FiO2


 


9/17/19 01:18 97.1       


 


9/16/19 23:47  76 19 128/76 (93) 100 Room Air  








Status:  improved


Disposition:  HOME, SELF-CARE


Condition:  Improved


Scripts


Hydrocodone Bit/Acetaminophen 5-325* (NORCO 5-325*) 1 Each Tablet


1 TAB ORAL Q6H PRN for For Pain, #6 TAB 0 Refills


   Prov: Jorge Robb MD         9/17/19











Jorge Robb MD Sep 17, 2019 00:18

## 2020-11-05 ENCOUNTER — HOSPITAL ENCOUNTER (INPATIENT)
Dept: HOSPITAL 72 - EMR | Age: 65
LOS: 1 days | Discharge: HOME | DRG: 247 | End: 2020-11-06
Payer: MEDICAID

## 2020-11-05 VITALS — WEIGHT: 202 LBS | HEIGHT: 61 IN | BODY MASS INDEX: 38.14 KG/M2

## 2020-11-05 VITALS — SYSTOLIC BLOOD PRESSURE: 148 MMHG | DIASTOLIC BLOOD PRESSURE: 73 MMHG

## 2020-11-05 DIAGNOSIS — K76.0: ICD-10-CM

## 2020-11-05 DIAGNOSIS — M54.41: ICD-10-CM

## 2020-11-05 DIAGNOSIS — K59.09: ICD-10-CM

## 2020-11-05 DIAGNOSIS — E66.01: ICD-10-CM

## 2020-11-05 DIAGNOSIS — K56.609: Primary | ICD-10-CM

## 2020-11-05 LAB
ADD MANUAL DIFF: NO
ALBUMIN SERPL-MCNC: 3.7 G/DL (ref 3.4–5)
ALBUMIN/GLOB SERPL: 1.4 {RATIO} (ref 1–2.7)
ALP SERPL-CCNC: 118 U/L (ref 46–116)
ALT SERPL-CCNC: 28 U/L (ref 12–78)
ANION GAP SERPL CALC-SCNC: 7 MMOL/L (ref 5–15)
APPEARANCE UR: CLEAR
APTT PPP: (no result) S
AST SERPL-CCNC: 30 U/L (ref 15–37)
BASOPHILS NFR BLD AUTO: 1.7 % (ref 0–2)
BILIRUB SERPL-MCNC: 0.4 MG/DL (ref 0.2–1)
BUN SERPL-MCNC: 16 MG/DL (ref 7–18)
CALCIUM SERPL-MCNC: 8.9 MG/DL (ref 8.5–10.1)
CHLORIDE SERPL-SCNC: 102 MMOL/L (ref 98–107)
CO2 SERPL-SCNC: 30 MMOL/L (ref 21–32)
CREAT SERPL-MCNC: 0.9 MG/DL (ref 0.55–1.3)
EOSINOPHIL NFR BLD AUTO: 2.4 % (ref 0–3)
ERYTHROCYTE [DISTWIDTH] IN BLOOD BY AUTOMATED COUNT: 13.4 % (ref 11.6–14.8)
GLOBULIN SER-MCNC: 2.7 G/DL
GLUCOSE UR STRIP-MCNC: NEGATIVE MG/DL
HCT VFR BLD CALC: 41.4 % (ref 37–47)
HGB BLD-MCNC: 14.1 G/DL (ref 12–16)
KETONES UR QL STRIP: NEGATIVE
LEUKOCYTE ESTERASE UR QL STRIP: NEGATIVE
LYMPHOCYTES NFR BLD AUTO: 44.1 % (ref 20–45)
MCV RBC AUTO: 95 FL (ref 80–99)
MONOCYTES NFR BLD AUTO: 7.6 % (ref 1–10)
NEUTROPHILS NFR BLD AUTO: 44.2 % (ref 45–75)
NITRITE UR QL STRIP: NEGATIVE
PH UR STRIP: 5 [PH] (ref 4.5–8)
PLATELET # BLD: 200 K/UL (ref 150–450)
POTASSIUM SERPL-SCNC: 4 MMOL/L (ref 3.5–5.1)
PROT UR QL STRIP: NEGATIVE
RBC # BLD AUTO: 4.38 M/UL (ref 4.2–5.4)
SODIUM SERPL-SCNC: 139 MMOL/L (ref 136–145)
SP GR UR STRIP: 1.01 (ref 1–1.03)
UROBILINOGEN UR-MCNC: NORMAL MG/DL (ref 0–1)
WBC # BLD AUTO: 9.4 K/UL (ref 4.8–10.8)

## 2020-11-05 PROCEDURE — 74177 CT ABD & PELVIS W/CONTRAST: CPT

## 2020-11-05 PROCEDURE — 96375 TX/PRO/DX INJ NEW DRUG ADDON: CPT

## 2020-11-05 PROCEDURE — 84100 ASSAY OF PHOSPHORUS: CPT

## 2020-11-05 PROCEDURE — 81003 URINALYSIS AUTO W/O SCOPE: CPT

## 2020-11-05 PROCEDURE — 82962 GLUCOSE BLOOD TEST: CPT

## 2020-11-05 PROCEDURE — 85025 COMPLETE CBC W/AUTO DIFF WBC: CPT

## 2020-11-05 PROCEDURE — 83735 ASSAY OF MAGNESIUM: CPT

## 2020-11-05 PROCEDURE — 96374 THER/PROPH/DIAG INJ IV PUSH: CPT

## 2020-11-05 PROCEDURE — 80053 COMPREHEN METABOLIC PANEL: CPT

## 2020-11-05 PROCEDURE — 96361 HYDRATE IV INFUSION ADD-ON: CPT

## 2020-11-05 PROCEDURE — 83690 ASSAY OF LIPASE: CPT

## 2020-11-05 PROCEDURE — 99285 EMERGENCY DEPT VISIT HI MDM: CPT

## 2020-11-05 PROCEDURE — 36415 COLL VENOUS BLD VENIPUNCTURE: CPT

## 2020-11-05 PROCEDURE — 74018 RADEX ABDOMEN 1 VIEW: CPT

## 2020-11-05 NOTE — EMERGENCY ROOM REPORT
History of Present Illness


General


Chief Complaint:  Abdominal Pain


Source:  Patient





Present Illness


HPI


Disclaimer: Please note that this report is being documented using DRAGON 

technology. This can lead to erroneous entry secondary to incorrect 

interpretation by the dictating instrument.





HPI: 65-year-old female presents for evaluation abdominal pain.  She reports 

intermittent left lower quadrant pain for several months however became 10/10 in

intensity today when standing.  She states is somewhat aggravated by motion.  No

significant association with eating and drinking.  Denies recent vomiting or 

diarrhea or fever.  Prior history of  section.  Denies prior history of 

diverticulitis.  Denies dysuria, hematuria, vaginal bleeding, vaginal discharge,

flank pain.


 


PMH: Obesity


 


PSH: Back surgery,  section


 


Allergies: Reviewed


 


Social Hx: Tobacco use


Allergies:  


Coded Allergies:  


     No Known Allergies (Unverified , 12)





COVID-19 Screening


Contact w/high risk pt:  No


Experienced COVID-19 symptoms?:  No


COVID-19 Testing performed PTA:  No





Patient History


Last Menstrual Period:  UNK


Pregnant Now:  No


:  3


Para:  3





Nursing Documentation-PMH


Past Medical History:  No History, Except For


Hx Neurological Problems:  Yes - BACK SURGERY





Review of Systems


All Other Systems:  negative except mentioned in HPI





Physical Exam





Vital Signs








  Date Time  Temp Pulse Resp B/P (MAP) Pulse Ox O2 Delivery O2 Flow Rate FiO2


 


20 20:53 98.2 68 18 148/73 (98) 98 Room Air  





 





General: Awake and alert, no acute distress


HEENT: NC/AT. EOMI. 


Cardiovascular: RRR.  S1 and S2 normal.  No murmur appreciated


Resp: Normal work of breathing. No cough, wheezing or crackles appreciated


Abdomen: Abdomen is soft, nondistended.  Obese abdomen.  Tender palpation in the

inguinal region without palpable deformity.  Also tenderness in suprapubic 

region over the previous surgical scar in the midline.  Mild periumbilical 

tenderness as well.  No palpable defect or mass.  No tenderness in the right 

lower quadrant.  There are some tenderness in the left lower quadrant without 

guarding or rebound.  No tenderness in the upper quadrants.


Skin: Intact.  No abrasions, laceration or rash over the exposed skin


MSK: Normal tone and bulk. Moving all extremities.  No obvious deformity.


Neuro: Awake and alert.  Mentating appropriately.





Medical Decision Making


ER Course


Is a 64-year-old female presenting for evaluation abdominal pain.  Differential 

includes was not limited to gastritis, gastroenteritis, pancreatitis, 

cholecystitis, nephrolithiasis, mesenteric ischemia, appendicitis, UTI, 

diverticulitis, abdominal mass, bowel obstruction among others.  Patient kept 

n.p.o.  Started on IV fluids, antiemetics, pain medications.  Labs have returned

within normal limits.  No leukocytosis, no electrolyte abnormalities, renal 

function at baseline and no evidence of urinary tract infection.  Patient is 

pending CT scan to evaluate for hernia, obstruction, other intra-abdominal 

abnormalities.  Signed out to oncoming physician pending CT and ultimate 

disposition.





Laboratory Tests








Test


 20


21:30 20


05:19 20


06:26


 


White Blood Count


 9.4 K/UL


(4.8-10.8) 8.0 K/UL


(4.8-10.8) 





 


Red Blood Count


 4.38 M/UL


(4.20-5.40) 4.10 M/UL


(4.20-5.40)  L 





 


Hemoglobin


 14.1 G/DL


(12.0-16.0) 12.9 G/DL


(12.0-16.0) 





 


Hematocrit


 41.4 %


(37.0-47.0) 41.2 %


(37.0-47.0) 





 


Mean Corpuscular Volume


 95 FL (80-99)  


 101 FL (80-99)


H 





 


Mean Corpuscular Hemoglobin


 32.2 PG


(27.0-31.0)  H 31.5 PG


(27.0-31.0)  H 





 


Mean Corpuscular Hemoglobin


Concent 34.0 G/DL


(32.0-36.0) 31.4 G/DL


(32.0-36.0)  L 





 


Red Cell Distribution Width


 13.4 %


(11.6-14.8) 13.4 %


(11.6-14.8) 





 


Platelet Count


 200 K/UL


(150-450) 181 K/UL


(150-450) 





 


Mean Platelet Volume


 9.6 FL


(6.5-10.1) 8.3 FL


(6.5-10.1) 





 


Neutrophils (%) (Auto)


 44.2 %


(45.0-75.0)  L 41.3 %


(45.0-75.0)  L 





 


Lymphocytes (%) (Auto)


 44.1 %


(20.0-45.0) 46.7 %


(20.0-45.0)  H 





 


Monocytes (%) (Auto)


 7.6 %


(1.0-10.0) 7.8 %


(1.0-10.0) 





 


Eosinophils (%) (Auto)


 2.4 %


(0.0-3.0) 3.2 %


(0.0-3.0)  H 





 


Basophils (%) (Auto)


 1.7 %


(0.0-2.0) 1.0 %


(0.0-2.0) 





 


Urine Color Pale yellow    


 


Urine Appearance Clear    


 


Urine pH 5 (4.5-8.0)    


 


Urine Specific Gravity


 1.010


(1.005-1.035) 


 





 


Urine Protein


 Negative


(NEGATIVE) 


 





 


Urine Glucose (UA)


 Negative


(NEGATIVE) 


 





 


Urine Ketones


 Negative


(NEGATIVE) 


 





 


Urine Blood


 Negative


(NEGATIVE) 


 





 


Urine Nitrite


 Negative


(NEGATIVE) 


 





 


Urine Bilirubin


 Negative


(NEGATIVE) 


 





 


Urine Urobilinogen


 Normal MG/DL


(0.0-1.0) 


 





 


Urine Leukocyte Esterase


 Negative


(NEGATIVE) 


 





 


Sodium Level


 139 MMOL/L


(136-145) 143 MMOL/L


(136-145) 





 


Potassium Level


 4.0 MMOL/L


(3.5-5.1) 3.5 MMOL/L


(3.5-5.1) 





 


Chloride Level


 102 MMOL/L


() 105 MMOL/L


() 





 


Carbon Dioxide Level


 30 MMOL/L


(21-32) 33 MMOL/L


(21-32)  H 





 


Anion Gap


 7 mmol/L


(5-15) 5 mmol/L


(5-15) 





 


Blood Urea Nitrogen


 16 mg/dL


(7-18) 12 mg/dL


(7-18) 





 


Creatinine


 0.9 MG/DL


(0.55-1.30) 0.8 MG/DL


(0.55-1.30) 





 


Estimated Glomerular


Filtration Rate > 60 mL/min


(>60) > 60 mL/min


(>60) 





 


Glucose Level


 95 MG/DL


() 95 MG/DL


() 





 


Calcium Level


 8.9 MG/DL


(8.5-10.1) 8.6 MG/DL


(8.5-10.1) 





 


Total Bilirubin


 0.4 MG/DL


(0.2-1.0) 0.5 MG/DL


(0.2-1.0) 





 


Aspartate Amino Transferase


(AST) 30 U/L (15-37)


 26 U/L (15-37)


 





 


Alanine Aminotransferase (ALT)


 28 U/L (12-78)


 23 U/L (12-78)


 





 


Alkaline Phosphatase


 118 U/L


()  H 100 U/L


() 





 


Total Protein


 6.4 G/DL


(6.4-8.2) 6.0 G/DL


(6.4-8.2)  L 





 


Albumin


 3.7 G/DL


(3.4-5.0) 3.4 G/DL


(3.4-5.0) 





 


Globulin 2.7 g/dL   2.6 g/dL   


 


Albumin/Globulin Ratio 1.4 (1.0-2.7)   1.3 (1.0-2.7)   


 


Lipase


 108 U/L


() 


 





 


Phosphorus Level


 


 3.5 MG/DL


(2.5-4.9) 





 


Magnesium Level


 


 1.9 MG/DL


(1.8-2.4) 





 


POC Whole Blood Glucose   Pending  











Last Vital Signs








  Date Time  Temp Pulse Resp B/P (MAP) Pulse Ox O2 Delivery O2 Flow Rate FiO2


 


20 20:53 98.2 68 18 148/73 (98) 98 Room Air  








Signed Out To:  Dr. Arora


Referrals:  


[facility],REFERRING (PCP)











Yovani Butterfield MD               2020 21:27

## 2020-11-05 NOTE — DIAGNOSTIC IMAGING REPORT
EXAM:

  CT Abdomen and Pelvis With Intravenous Contrast

 

CLINICAL HISTORY:

  ABD PAIN

 

TECHNIQUE:

  Axial computed tomography images of the abdomen and pelvis with 

intravenous contrast.  CTDI is 14.1 mGy and DLP is 793.6 mGy-cm.  One or 

more of the following dose reduction techniques were used: automated 

exposure control, adjustment of the mA and/or kV according to patient 

size, use of iterative reconstruction technique.

 

COMPARISON:

  10/28/2017

 

FINDINGS:

  Lung bases:  No mass.  No consolidation.

 

 ABDOMEN:

  Liver: Mild steatosis.

  Gallbladder and bile ducts:  Unremarkable.

  Pancreas:  Unremarkable.

  Spleen:  Unremarkable.

  Adrenals:  Unremarkable.

  Kidneys and ureters:  No hydronephrosis.  Cysts.  Punctate 

nonobstructive stone in the right kidney.

  Stomach and bowel:  No bowel obstruction. No bowel wall thickening.  

Mild stool burden.  Some prominent mildly distended fluid-filled small 

bowel loops in the left abdomen.

 

 PELVIS:

  Appendix:  No evidence of appendicitis.

  Bladder:  Unremarkable.

  Reproductive:  Unremarkable.

 

 ABDOMEN and PELVIS:

  Intraperitoneal space:  Unremarkable.

  Bones/joints:  No acute fractures.  Posterior fusion.  Dextroscoliosis.

  Soft tissues:  Unremarkable.

  Vasculature:  No abdominal aortic aneurysm.

  Lymph nodes:  No enlarged lymph nodes.

 

IMPRESSION:     

 

1. Some prominent mildly distended fluid-filled small bowel loops in the 

left abdomen.  Remaining small bowel loops are unremarkable.  May 

represent gastroenteritis versus very early partial small bowel 

obstruction.

 

2.  Mild stool burden throughout the colon.

 

3.  Mild hepatic steatosis.

4.  Nonobstructive stone in the right kidney.

## 2020-11-06 VITALS — SYSTOLIC BLOOD PRESSURE: 133 MMHG | DIASTOLIC BLOOD PRESSURE: 58 MMHG

## 2020-11-06 VITALS — DIASTOLIC BLOOD PRESSURE: 64 MMHG | SYSTOLIC BLOOD PRESSURE: 119 MMHG

## 2020-11-06 VITALS — SYSTOLIC BLOOD PRESSURE: 143 MMHG | DIASTOLIC BLOOD PRESSURE: 60 MMHG

## 2020-11-06 VITALS — SYSTOLIC BLOOD PRESSURE: 140 MMHG | DIASTOLIC BLOOD PRESSURE: 83 MMHG

## 2020-11-06 LAB
ADD MANUAL DIFF: NO
ALBUMIN SERPL-MCNC: 3.4 G/DL (ref 3.4–5)
ALBUMIN/GLOB SERPL: 1.3 {RATIO} (ref 1–2.7)
ALP SERPL-CCNC: 100 U/L (ref 46–116)
ALT SERPL-CCNC: 23 U/L (ref 12–78)
ANION GAP SERPL CALC-SCNC: 5 MMOL/L (ref 5–15)
AST SERPL-CCNC: 26 U/L (ref 15–37)
BASOPHILS NFR BLD AUTO: 1 % (ref 0–2)
BILIRUB SERPL-MCNC: 0.5 MG/DL (ref 0.2–1)
BUN SERPL-MCNC: 12 MG/DL (ref 7–18)
CALCIUM SERPL-MCNC: 8.6 MG/DL (ref 8.5–10.1)
CHLORIDE SERPL-SCNC: 105 MMOL/L (ref 98–107)
CO2 SERPL-SCNC: 33 MMOL/L (ref 21–32)
CREAT SERPL-MCNC: 0.8 MG/DL (ref 0.55–1.3)
EOSINOPHIL NFR BLD AUTO: 3.2 % (ref 0–3)
ERYTHROCYTE [DISTWIDTH] IN BLOOD BY AUTOMATED COUNT: 13.4 % (ref 11.6–14.8)
GLOBULIN SER-MCNC: 2.6 G/DL
HCT VFR BLD CALC: 41.2 % (ref 37–47)
HGB BLD-MCNC: 12.9 G/DL (ref 12–16)
LYMPHOCYTES NFR BLD AUTO: 46.7 % (ref 20–45)
MCV RBC AUTO: 101 FL (ref 80–99)
MONOCYTES NFR BLD AUTO: 7.8 % (ref 1–10)
NEUTROPHILS NFR BLD AUTO: 41.3 % (ref 45–75)
PHOSPHATE SERPL-MCNC: 3.5 MG/DL (ref 2.5–4.9)
PLATELET # BLD: 181 K/UL (ref 150–450)
POTASSIUM SERPL-SCNC: 3.5 MMOL/L (ref 3.5–5.1)
RBC # BLD AUTO: 4.1 M/UL (ref 4.2–5.4)
SODIUM SERPL-SCNC: 143 MMOL/L (ref 136–145)
WBC # BLD AUTO: 8 K/UL (ref 4.8–10.8)

## 2020-11-06 RX ADMIN — HEPARIN SODIUM SCH UNITS: 5000 INJECTION INTRAVENOUS; SUBCUTANEOUS at 05:27

## 2020-11-06 RX ADMIN — MORPHINE SULFATE PRN MG: 2 INJECTION, SOLUTION INTRAMUSCULAR; INTRAVENOUS at 12:58

## 2020-11-06 RX ADMIN — HEPARIN SODIUM SCH UNITS: 5000 INJECTION INTRAVENOUS; SUBCUTANEOUS at 14:00

## 2020-11-06 RX ADMIN — MORPHINE SULFATE PRN MG: 2 INJECTION, SOLUTION INTRAMUSCULAR; INTRAVENOUS at 02:45

## 2020-11-06 RX ADMIN — MORPHINE SULFATE PRN MG: 2 INJECTION, SOLUTION INTRAMUSCULAR; INTRAVENOUS at 07:28

## 2020-11-06 NOTE — HISTORY & PHYSICAL
History and Physical


History & Physicial


Job # [id number]











Jonn Schaefer MD                  Nov 6, 2020 14:26

## 2020-11-06 NOTE — EMERGENCY ROOM REPORT
Physical Exam





Vital Signs








  Date Time  Temp Pulse Resp B/P (MAP) Pulse Ox O2 Delivery O2 Flow Rate FiO2


 


11/5/20 20:53 98.2 68 18 148/73 (98) 98 Room Air  








Sp02 EP Interpretation:  reviewed, normal





Medical Decision Making


Diagnostic Impression:  


   Primary Impression:  


   Abdominal pain


   Additional Impressions:  


   Small bowel obstruction


   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


   Constipation


   Gastroenteritis


   Kidney stone


   Hepatic steatosis


ER Course


65-year-old female presenting with acute on chronic left lower quadrant 

abdominal pain with nausea.


Labs are unremarkable.  Urinalysis is negative for UTI.  CT scan shows early 

small bowel obstruction vs gastroenteritis.  Incidentally also found hepatic 

steatosis, nonobstructing kidney stone, and gastroenteritis.No diverticulosis or

diverticulitis.


continues to be nauseous and symptomatic in the ER.  Unable to tolerate p.o.  

Will admit for observation.


No NG tube indicated at this time.  Will give IV fluids, pain control, and bowel

rest.





I spoke with Dr. Schaefer, and reviewed the patients presentation, workup, 

results, and treatment.  


They will admit the patient for further care and evaluation, and assume care of 

the patient at this time.


CT/MRI/US Diagnostic Results


CT/MRI/US Diagnostic Results :  


   Impression


CT Abdomen Pelvis w/Contras


EXAM:


FINDINGS:


  Lung bases:  No mass.  No consolidation.


 ABDOMEN:


  Liver: Mild steatosis.


  Gallbladder and bile ducts:  Unremarkable.


  Pancreas:  Unremarkable.


  Spleen:  Unremarkable.


  Adrenals:  Unremarkable.


  Kidneys and ureters:  No hydronephrosis.  Cysts.  Punctate 


nonobstructive stone in the right kidney.


  Stomach and bowel:  No bowel obstruction. No bowel wall thickening.  


Mild stool burden.  Some prominent mildly distended fluid-filled small 


bowel loops in the left abdomen.


 


 PELVIS:


  Appendix:  No evidence of appendicitis.


  Bladder:  Unremarkable.


  Reproductive:  Unremarkable.


 


 ABDOMEN and PELVIS:


  Intraperitoneal space:  Unremarkable.


  Bones/joints:  No acute fractures.  Posterior fusion.  Dextroscoliosis.


  Soft tissues:  Unremarkable.


  Vasculature:  No abdominal aortic aneurysm.


  Lymph nodes:  No enlarged lymph nodes.


 


IMPRESSION:     


 


1. Some prominent mildly distended fluid-filled small bowel loops in the 


left abdomen.  Remaining small bowel loops are unremarkable.  May 


represent gastroenteritis versus very early partial small bowel 


obstruction.


 


2.  Mild stool burden throughout the colon.


 


3.  Mild hepatic steatosis.


4.  Nonobstructive stone in the right kidney.


Dictated By:                            HYUN OLIVAS M.D.


Reevaluation Time:  00:06





Last Vital Signs








  Date Time  Temp Pulse Resp B/P (MAP) Pulse Ox O2 Delivery O2 Flow Rate FiO2


 


11/5/20 21:57 98.2       


 


11/5/20 21:15  70 18   Room Air  


 


11/5/20 21:15    148/73 98   








Status:  improved


Disposition:  ADMITTED AS INPATIENT


Admit Decision Time:  12:00


Condition:  Stable


Referrals:  


[facility],REFERRING (PCP)











Mirian Arora D.O.            Nov 6, 2020 00:08

## 2020-11-06 NOTE — HISTORY AND PHYSICAL REPORT
DATE OF ADMISSION:  11/06/2020

CHIEF COMPLAINT:  Abdominal pain.



HISTORY OF PRESENT ILLNESS:  This is a 65-year-old very delightful

 female with past medical history significant for chronic

abdominal pain with history of chronic constipation, lower back pain,

gastroenteritis, hepatic steatosis, sciatica of right side associated with

lumbar disc disease, and small bowel obstruction in the past, who

presented to the hospital complaining of left lower quadrant abdominal

pain for several months.  The patient stated the pain is 10/10 in

intensity, progressive worsening and somewhat aggravated with motion.

Denies any nausea, vomiting, or diarrhea, however, the patient has been

having history of constipation.  She had a history of C-sections in the

past as well as left ovary resection.  Shortly after initial evaluation in

the emergency department, the patient was admitted to the hospital with

abdominal pain possible due to the small bowel obstruction.



PAST MEDICAL HISTORY/PAST SURGICAL HISTORY:  As above, history of chronic

abdominal pain, lower back pain due to the sciatica, constipation,

gastroenteritis, hepatic steatosis, and obesity.



MEDICATIONS AT HOME:  Please refer to medication reconciliation.



ALLERGIES:  No known drug allergies.



SOCIAL HISTORY:  Positive history of smoking 10 cigarettes a day.  Denies

any alcohol or substance abuse.



FAMILY HISTORY:  Noncontributory.



REVIEW OF SYSTEMS:  Mostly as above.  Denies any dysuria, frequency,

hematuria, fever, chills, or nausea or vomiting.  Denies any loss of

consciousness.  Denies any fall or head trauma.



PHYSICAL EXAMINATION:

VITAL SIGNS:  On admission, temperature 98.2, pulse of 68, respirations 18,

and blood pressure 148/72.

GENERAL:  The patient is awake and responsive, in no acute

distress.

HEAD AND NECK:  Pupils are equal and reactive to light.  Extraocular

movements are intact.  Neck was supple.  No JVD.

LUNGS:  Good air entry with no wheezing or rales.

HEART:  S1, S2.  Regular rhythm.  No gallops.

ABDOMEN:  Soft, nondistended, nontender.  No rebound tenderness.  No fluid

shift.  Morbidly obese.

EXTREMITIES:  No cyanosis, clubbing, or edema.

NEUROLOGIC:  Cranial nerves II to XII grossly normal.  Moves all four

extremities.  Gait is intact.

RECTAL:  Refused and deferred.

GENITOURINARY:  Refused and deferred.

PSYCHIATRIC:  Mood and affect is intact.



LABORATORY DATA:  On admission from the emergency room, _____, hemoglobin

of 14.1, hematocrit 41, platelets 200,000.  Sodium 139, potassium 4.0,

chloride 102, bicarbonate 30, BUN 16, creatinine 0.9, and GFR greater than

60.  Alkaline phosphatase is 118, ALT of 30, AST of 28.  Lipase is 108.

UA is essentially unremarkable.



The patient had a CT of the abdomen and pelvis done, noted to have small

prominent mild distention of the free fluid, small bowel loops in the left

abdomen.  Remaining small bowel loops are unremarkable, may represent

gastroenteritis versus very early partial small bowel obstruction.  Mild

stool burden throughout the colon.  Mild hepatic steatosis.

Nonobstructive stone in the right kidney.



The patient had a KUB done showed focal dilated small bowel loops and on

previous CT scan are no longer evident, more reflect decreased ileus or

resolving partial small bowel obstruction.



ASSESSMENT:

1. Abdominal pain, possibly due to partial small bowel obstruction.

2. Chronic constipation.

3. Morbid obesity.

4. Back pain.

5. Hepatic steatosis.



PLAN:  Admit the patient to medical floor.  Start the patient on IV

hydration.  Follow up with the laboratory.  IV hydration.  Discussed case

with Dr. Otis Shannon from Surgery as well as Dr. Talley, Pulmonary

Critical Care.  If the patient's status improves, consider discharge home

to follow up as outpatient with Gastroenterology, Dr. Enriquez for

outpatient colonoscopy.









  ______________________________________________

  Jonn Schaefer M.D.





DR:  TRICE

D:  11/06/2020 18:47

T:  11/06/2020 19:11

JOB#:  [id number]

CC:

## 2020-11-06 NOTE — CONSULTATION
History of Present Illness


General


Date patient seen:  Nov 6, 2020


Chief Complaint:  Abdominal Pain





Present Illness


HPI


65-year-old female with hx of back surgery and sciatica, chronic back pain  

walked in to ER  for evaluation abdominal pain.  She reports intermittent left 

lower quadrant pain for several months however became 10/10 in intensity today 

when standing.  Denies recent vomiting or diarrhea or fever.


Allergies:  


Coded Allergies:  


     No Known Allergies (Unverified , 9/25/12)





Medication History


Scheduled


Alprazolam* (Xanax*), 1 MG ORAL DAILY, (Reported)


Carisoprodol* (Carisoprodol*), 350 MG ORAL Q6H, (Reported)





Scheduled PRN


Hydrocodone Bit/Acetaminophen 5-325* (Norco 5-325*), 1 TAB ORAL Q6H PRN for For 

Pain





Discontinued Medications


Ibuprofen (Motrin), 600 MG ORAL THREE TIMES A DAY


   Discontinued Reason: Pt stopped taking med





Patient History


Healthcare decision maker





Resuscitation status





Advanced Directive on File








Past Medical/Surgical History


Past Medical/Surgical History:  


(1) Back Pain Exacerbation


(2) Constipation


(3) Gastroenteritis


(4) Hepatic steatosis


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





Review of Systems


All Other Systems:  negative except mentioned in HPI





Physical Exam


General Appearance:  WD/WN


Lines, tubes and drains:  peripheral


HEENT:  normocephalic, anicteric, PERRL


Neck:  normal alignment


Respiratory/Chest:  chest wall non-tender, lungs clear


Cardiovascular/Chest:  normal peripheral pulses, normal rate


Genitourinary/Rectal:  normal genital exam, normal rectal exam


Extremities:  normal range of motion, non-tender


Skin Exam:  normal pigmentation


Neurologic:  CNs II-XII grossly normal





Last 24 Hour Vital Signs








  Date Time  Temp Pulse Resp B/P (MAP) Pulse Ox O2 Delivery O2 Flow Rate FiO2


 


11/6/20 12:00 97.7 59 18 133/58 (83) 96   


 


11/6/20 09:00      Room Air  


 


11/6/20 08:00 97.2 56 17 143/60 (87) 96   


 


11/6/20 04:00 97.0 63 16 119/64 (82) 96   


 


11/6/20 02:20      Room Air  


 


11/6/20 01:30 97.5 59 18 140/83 (102) 97   


 


11/6/20 01:19 98.2 85 18 137/82 98 Room Air  


 


11/5/20 23:40 98.2       


 


11/5/20 21:57 98.2       


 


11/5/20 21:15  70 18   Room Air  


 


11/5/20 21:15 98.2 70 18 148/73 98 Room Air  


 


11/5/20 20:53 98.2 68 18 148/73 (98) 98 Room Air  

















Intake and Output  


 


 11/5/20 11/6/20





 19:00 07:00


 


Intake Total  300 ml


 


Balance  300 ml


 


  


 


Intake IV Total  300 ml


 


# Voids  1











Laboratory Tests








Test


 11/5/20


21:30 11/6/20


05:19 11/6/20


06:26


 


White Blood Count


 9.4 K/UL


(4.8-10.8) 8.0 K/UL


(4.8-10.8) 





 


Red Blood Count


 4.38 M/UL


(4.20-5.40) 4.10 M/UL


(4.20-5.40)  L 





 


Hemoglobin


 14.1 G/DL


(12.0-16.0) 12.9 G/DL


(12.0-16.0) 





 


Hematocrit


 41.4 %


(37.0-47.0) 41.2 %


(37.0-47.0) 





 


Mean Corpuscular Volume


 95 FL (80-99)  


 101 FL (80-99)


H 





 


Mean Corpuscular Hemoglobin


 32.2 PG


(27.0-31.0)  H 31.5 PG


(27.0-31.0)  H 





 


Mean Corpuscular Hemoglobin


Concent 34.0 G/DL


(32.0-36.0) 31.4 G/DL


(32.0-36.0)  L 





 


Red Cell Distribution Width


 13.4 %


(11.6-14.8) 13.4 %


(11.6-14.8) 





 


Platelet Count


 200 K/UL


(150-450) 181 K/UL


(150-450) 





 


Mean Platelet Volume


 9.6 FL


(6.5-10.1) 8.3 FL


(6.5-10.1) 





 


Neutrophils (%) (Auto)


 44.2 %


(45.0-75.0)  L 41.3 %


(45.0-75.0)  L 





 


Lymphocytes (%) (Auto)


 44.1 %


(20.0-45.0) 46.7 %


(20.0-45.0)  H 





 


Monocytes (%) (Auto)


 7.6 %


(1.0-10.0) 7.8 %


(1.0-10.0) 





 


Eosinophils (%) (Auto)


 2.4 %


(0.0-3.0) 3.2 %


(0.0-3.0)  H 





 


Basophils (%) (Auto)


 1.7 %


(0.0-2.0) 1.0 %


(0.0-2.0) 





 


Urine Color Pale yellow    


 


Urine Appearance Clear    


 


Urine pH 5 (4.5-8.0)    


 


Urine Specific Gravity


 1.010


(1.005-1.035) 


 





 


Urine Protein


 Negative


(NEGATIVE) 


 





 


Urine Glucose (UA)


 Negative


(NEGATIVE) 


 





 


Urine Ketones


 Negative


(NEGATIVE) 


 





 


Urine Blood


 Negative


(NEGATIVE) 


 





 


Urine Nitrite


 Negative


(NEGATIVE) 


 





 


Urine Bilirubin


 Negative


(NEGATIVE) 


 





 


Urine Urobilinogen


 Normal MG/DL


(0.0-1.0) 


 





 


Urine Leukocyte Esterase


 Negative


(NEGATIVE) 


 





 


Sodium Level


 139 MMOL/L


(136-145) 143 MMOL/L


(136-145) 





 


Potassium Level


 4.0 MMOL/L


(3.5-5.1) 3.5 MMOL/L


(3.5-5.1) 





 


Chloride Level


 102 MMOL/L


() 105 MMOL/L


() 





 


Carbon Dioxide Level


 30 MMOL/L


(21-32) 33 MMOL/L


(21-32)  H 





 


Anion Gap


 7 mmol/L


(5-15) 5 mmol/L


(5-15) 





 


Blood Urea Nitrogen


 16 mg/dL


(7-18) 12 mg/dL


(7-18) 





 


Creatinine


 0.9 MG/DL


(0.55-1.30) 0.8 MG/DL


(0.55-1.30) 





 


Estimat Glomerular Filtration


Rate > 60 mL/min


(>60) > 60 mL/min


(>60) 





 


Glucose Level


 95 MG/DL


() 95 MG/DL


() 





 


Calcium Level


 8.9 MG/DL


(8.5-10.1) 8.6 MG/DL


(8.5-10.1) 





 


Total Bilirubin


 0.4 MG/DL


(0.2-1.0) 0.5 MG/DL


(0.2-1.0) 





 


Aspartate Amino Transf


(AST/SGOT) 30 U/L (15-37)


 26 U/L (15-37)


 





 


Alanine Aminotransferase


(ALT/SGPT) 28 U/L (12-78)


 23 U/L (12-78)


 





 


Alkaline Phosphatase


 118 U/L


()  H 100 U/L


() 





 


Total Protein


 6.4 G/DL


(6.4-8.2) 6.0 G/DL


(6.4-8.2)  L 





 


Albumin


 3.7 G/DL


(3.4-5.0) 3.4 G/DL


(3.4-5.0) 





 


Globulin 2.7 g/dL   2.6 g/dL   


 


Albumin/Globulin Ratio 1.4 (1.0-2.7)   1.3 (1.0-2.7)   


 


Lipase


 108 U/L


() 


 





 


Phosphorus Level


 


 3.5 MG/DL


(2.5-4.9) 





 


Magnesium Level


 


 1.9 MG/DL


(1.8-2.4) 





 


POC Whole Blood Glucose   Pending  








Height (Feet):  5


Height (Inches):  1.00


Weight (Pounds):  202


Medications





Current Medications








 Medications


  (Trade)  Dose


 Ordered  Sig/Lynn


 Route


 PRN Reason  Start Time


 Stop Time Status Last Admin


Dose Admin


 


 Dextrose/Sodium


 Chloride  1,000 ml @ 


 75 mls/hr  [id number]


 IV


   11/6/20 02:15


 12/6/20 02:14  11/6/20 02:44





 


 Heparin Sodium


  (Porcine)


  (Heparin 5000


 units/ml)  5,000 units  EVERY 8  HOURS


 SUBQ


   11/6/20 06:00


 12/21/20 05:59  11/6/20 05:27





 


 Iohexol


  (OMNIPAQUE-300


 100ml)  100 ml  NOW  PRN


 INJ


 Radiology Procedure  11/5/20 21:15


 11/7/20 21:14   





 


 Morphine Sulfate


  (Morphine


 Sulfate)  2 mg  Q4H  PRN


 IVP


 Severe Pain (Pain Scale 7-10)  11/6/20 02:15


 11/13/20 02:14  11/6/20 12:58





 


 Ondansetron HCl


  (Zofran)  4 mg  Q6H  PRN


 IVP


 Nausea & Vomiting  11/6/20 02:15


 12/6/20 02:14  11/6/20 09:33














Assessment/Plan


Problem List:  


(1) Small bowel obstruction


ICD Codes:  K56.609 - Unspecified intestinal obstruction, unspecified as to 

partial versus complete obstruction


SNOMED:  993843311


(2) Back Pain Exacerbation


(3) Hepatic steatosis


ICD Codes:  K76.0 - Fatty (change of) liver, not elsewhere classified


SNOMED:  650264300


(4) Gastroenteritis


ICD Codes:  K52.9 - Noninfective gastroenteritis and colitis, unspecified


SNOMED:  87058951


(5) Constipation


ICD Codes:  K59.00 - Constipation, unspecified


SNOMED:  67084974


Assessment/Plan:


GI and surgical evaluation


symptomatic treatment


check electrolytes, and inflammatory markers


pain management.











Anastacia Talley MD               Nov 6, 2020 13:25

## 2020-11-06 NOTE — GENERAL PROGRESS NOTE
Subjective


ROS Limited/Unobtainable:  No


Allergies:  


Coded Allergies:  


     No Known Allergies (Unverified , 9/25/12)





Objective





Last 24 Hour Vital Signs








  Date Time  Temp Pulse Resp B/P (MAP) Pulse Ox O2 Delivery O2 Flow Rate FiO2


 


11/6/20 12:00 97.7 59 18 133/58 (83) 96   


 


11/6/20 09:00      Room Air  


 


11/6/20 08:00 97.2 56 17 143/60 (87) 96   


 


11/6/20 04:00 97.0 63 16 119/64 (82) 96   


 


11/6/20 02:20      Room Air  


 


11/6/20 01:30 97.5 59 18 140/83 (102) 97   


 


11/6/20 01:19 98.2 85 18 137/82 98 Room Air  


 


11/5/20 23:40 98.2       


 


11/5/20 21:57 98.2       


 


11/5/20 21:15  70 18   Room Air  


 


11/5/20 21:15 98.2 70 18 148/73 98 Room Air  


 


11/5/20 20:53 98.2 68 18 148/73 (98) 98 Room Air  

















Intake and Output  


 


 11/5/20 11/6/20





 19:00 07:00


 


Intake Total  300 ml


 


Balance  300 ml


 


  


 


Intake IV Total  300 ml


 


# Voids  1








Laboratory Tests


11/5/20 21:30: 


White Blood Count 9.4, Red Blood Count 4.38, Hemoglobin 14.1, Hematocrit 41.4, 

Mean Corpuscular Volume 95, Mean Corpuscular Hemoglobin 32.2H, Mean Corpuscular 

Hemoglobin Concent 34.0, Red Cell Distribution Width 13.4, Platelet Count 200, 

Mean Platelet Volume 9.6, Neutrophils (%) (Auto) 44.2L, Lymphocytes (%) (Auto) 

44.1, Monocytes (%) (Auto) 7.6, Eosinophils (%) (Auto) 2.4, Basophils (%) (Auto)

1.7, Urine Color Pale yellow, Urine Appearance Clear, Urine pH 5, Urine Specific

Gravity 1.010, Urine Protein Negative, Urine Glucose (UA) Negative, Urine 

Ketones Negative, Urine Blood Negative, Urine Nitrite Negative, Urine Bilirubin 

Negative, Urine Urobilinogen Normal, Urine Leukocyte Esterase Negative, Sodium 

Level 139, Potassium Level 4.0, Chloride Level 102, Carbon Dioxide Level 30, 

Anion Gap 7, Blood Urea Nitrogen 16, Creatinine 0.9, Estimat Glomerular 

Filtration Rate > 60, Glucose Level 95, Calcium Level 8.9, Total Bilirubin 0.4, 

Aspartate Amino Transf (AST/SGOT) 30, Alanine Aminotransferase (ALT/SGPT) 28, 

Alkaline Phosphatase 118H, Total Protein 6.4, Albumin 3.7, Globulin 2.7, 

Albumin/Globulin Ratio 1.4, Lipase 108


11/6/20 05:19: 


White Blood Count 8.0, Red Blood Count 4.10L, Hemoglobin 12.9, Hematocrit 41.2, 

Mean Corpuscular Volume 101H, Mean Corpuscular Hemoglobin 31.5H, Mean 

Corpuscular Hemoglobin Concent 31.4L, Red Cell Distribution Width 13.4, Platelet

Count 181, Mean Platelet Volume 8.3, Neutrophils (%) (Auto) 41.3L, Lymphocytes 

(%) (Auto) 46.7H, Monocytes (%) (Auto) 7.8, Eosinophils (%) (Auto) 3.2H, 

Basophils (%) (Auto) 1.0, Sodium Level 143, Potassium Level 3.5, Chloride Level 

105, Carbon Dioxide Level 33H, Anion Gap 5, Blood Urea Nitrogen 12, Creatinine 

0.8, Estimat Glomerular Filtration Rate > 60, Glucose Level 95, Calcium Level 

8.6, Total Bilirubin 0.5, Aspartate Amino Transf (AST/SGOT) 26, Alanine 

Aminotransferase (ALT/SGPT) 23, Alkaline Phosphatase 100, Total Protein 6.0L, 

Albumin 3.4, Globulin 2.6, Albumin/Globulin Ratio 1.3, Phosphorus Level 3.5, 

Magnesium Level 1.9


11/6/20 06:26: POC Whole Blood Glucose [Pending]


Height (Feet):  5


Height (Inches):  1.00


Weight (Pounds):  202


General Appearance:  alert


EENT:  PERRL/EOMI


Neck:  supple


Cardiovascular:  normal rate


Respiratory/Chest:  decreased breath sounds


Abdomen:  soft, hypoactive bowel sounds, tender


Extremities:  non-tender





Assessment/Plan


Assessment/Plan:


abd pain


no anemia


no elevated LFTS





CT and X ray reviewed


passing gas


wants to eat


will need out patient colonoscopy











Shane Enriquez MD              Nov 6, 2020 13:56

## 2020-11-06 NOTE — CONSULTATION
History of Present Illness


General


Date patient seen:  2020


Reason for Hospitalization:  Abdominal Pain





Present Illness


HPI


65-year-old female presents for evaluation abdominal pain.  She reports 

intermittent left lower quadrant pain for several months however became 10/10 in

intensity today when standing.  She states is somewhat aggravated by motion.  No

significant association with eating and drinking.  Denies recent vomiting or 

diarrhea or fever.  Prior history of  section.  Denies prior history of 

diverticulitis.  Denies dysuria, hematuria, vaginal bleeding, vaginal discharge,

flank pain.


 surgery called to eval for possible sbo


Allergies:  


Coded Allergies:  


     No Known Allergies (Unverified , 12)





COVID-19 Screening


Contact w/high risk pt:  No


Experienced COVID-19 symptoms?:  No





Medication History


Scheduled


Alprazolam* (Xanax*), 1 MG ORAL DAILY, (Reported)


Carisoprodol* (Carisoprodol*), 350 MG ORAL Q6H, (Reported)





Scheduled PRN


Hydrocodone Bit/Acetaminophen 5-325* (Norco 5-325*), 1 TAB ORAL Q6H PRN for For 

Pain





Discontinued Medications


Ibuprofen (Motrin), 600 MG ORAL THREE TIMES A DAY


   Discontinued Reason: Pt stopped taking med





Patient History


History Provided By:  Medical Record, PMD


Healthcare decision maker





Resuscitation status





Advanced Directive on File








Past Medical/Surgical History


Past Medical/Surgical History:  


(1) Back Pain Exacerbation


(2) Acute exacerbation of chronic low back pain


(3) Constipation


(4) Gastroenteritis


(5) Hepatic steatosis


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


(7) Small bowel obstruction





Review of Systems


Review of Symptoms


General ROS: no weight loss or fever


Psychological ROS: no depression or mood changes, no memory loss


Ophthalmic ROS: no visual changes or eye irritation


ENT ROS: no nasal congestion, hearing loss, dizziness


Allergy and Immunology ROS: no allergic symptoms or urticaria


Hematological and Lymphatic ROS: no swollen glands, unusual bleeding or bruising


Endocrine ROS: no polyuria, polydipsia, weight changes, temperature intolerance


Respiratory ROS: no cough, shortness of breath, or wheezing


Cardiovascular ROS: no chest pain or dyspnea on exertion


Gastrointestinal ROS: denies abdominal pain, bright red blood in stool.


Musculoskeletal ROS: no myalgias or arthralgias


Neurological ROS: no TIA or stroke symptoms


Dermatological ROS: no new or changing skin lesions, rashes or pruritis





Physical Exam


Physical Exam


General appearance:  alert, cooperative, no distress, appears stated age


Head:  Normocephalic, without obvious abnormality, atraumatic


Eyes:  conjunctivae/corneas clear. PERRL, EOM's intact. Fundi benign


Throat:  Lips, mucosa, and tongue normal. Teeth and gums normal


Neck:  supple, symmetrical, trachea midline, no adenopathy, thyroid: not 

enlarged, symmetric, no tenderness/mass/nodules, no carotid bruit and no JVD


Lungs:  clear to auscultation bilaterally


Heart:  regular rate and rhythm, S1, S2 normal, no murmur, click, rub or gallop


Abdomen:  soft, non-tender. Bowel sounds normal. No masses,  no organomegaly


Extremities:  extremities normal, atraumatic, no cyanosis or edema


Pulses:  2+ and symmetric


Skin:  Skin color, texture, turgor normal. No rashes or lesions


Neurologic:  Grossly normal





Last 24 Hour Vital Signs








  Date Time  Temp Pulse Resp B/P (MAP) Pulse Ox O2 Delivery O2 Flow Rate FiO2


 


20 13:28 97.7       


 


20 12:00 97.7 59 18 133/58 (83) 96   


 


20 09:00      Room Air  


 


20 08:00 97.2 56 17 143/60 (87) 96   


 


20 04:00 97.0 63 16 119/64 (82) 96   


 


20 02:20      Room Air  


 


20 01:30 97.5 59 18 140/83 (102) 97   


 


20 01:19 98.2 85 18 137/82 98 Room Air  


 


20 23:40 98.2       


 


20 21:57 98.2       


 


20 21:15  70 18   Room Air  


 


20 21:15 98.2 70 18 148/73 98 Room Air  


 


20 20:53 98.2 68 18 148/73 (98) 98 Room Air  

















Intake and Output  


 


 20





 19:00 07:00


 


Intake Total  300 ml


 


Balance  300 ml


 


  


 


Intake IV Total  300 ml


 


# Voids  1











Laboratory Tests








Test


 20


21:30 20


05:19 20


06:26


 


White Blood Count


 9.4 K/UL


(4.8-10.8) 8.0 K/UL


(4.8-10.8) 





 


Red Blood Count


 4.38 M/UL


(4.20-5.40) 4.10 M/UL


(4.20-5.40)  L 





 


Hemoglobin


 14.1 G/DL


(12.0-16.0) 12.9 G/DL


(12.0-16.0) 





 


Hematocrit


 41.4 %


(37.0-47.0) 41.2 %


(37.0-47.0) 





 


Mean Corpuscular Volume


 95 FL (80-99)  


 101 FL (80-99)


H 





 


Mean Corpuscular Hemoglobin


 32.2 PG


(27.0-31.0)  H 31.5 PG


(27.0-31.0)  H 





 


Mean Corpuscular Hemoglobin


Concent 34.0 G/DL


(32.0-36.0) 31.4 G/DL


(32.0-36.0)  L 





 


Red Cell Distribution Width


 13.4 %


(11.6-14.8) 13.4 %


(11.6-14.8) 





 


Platelet Count


 200 K/UL


(150-450) 181 K/UL


(150-450) 





 


Mean Platelet Volume


 9.6 FL


(6.5-10.1) 8.3 FL


(6.5-10.1) 





 


Neutrophils (%) (Auto)


 44.2 %


(45.0-75.0)  L 41.3 %


(45.0-75.0)  L 





 


Lymphocytes (%) (Auto)


 44.1 %


(20.0-45.0) 46.7 %


(20.0-45.0)  H 





 


Monocytes (%) (Auto)


 7.6 %


(1.0-10.0) 7.8 %


(1.0-10.0) 





 


Eosinophils (%) (Auto)


 2.4 %


(0.0-3.0) 3.2 %


(0.0-3.0)  H 





 


Basophils (%) (Auto)


 1.7 %


(0.0-2.0) 1.0 %


(0.0-2.0) 





 


Urine Color Pale yellow    


 


Urine Appearance Clear    


 


Urine pH 5 (4.5-8.0)    


 


Urine Specific Gravity


 1.010


(1.005-1.035) 


 





 


Urine Protein


 Negative


(NEGATIVE) 


 





 


Urine Glucose (UA)


 Negative


(NEGATIVE) 


 





 


Urine Ketones


 Negative


(NEGATIVE) 


 





 


Urine Blood


 Negative


(NEGATIVE) 


 





 


Urine Nitrite


 Negative


(NEGATIVE) 


 





 


Urine Bilirubin


 Negative


(NEGATIVE) 


 





 


Urine Urobilinogen


 Normal MG/DL


(0.0-1.0) 


 





 


Urine Leukocyte Esterase


 Negative


(NEGATIVE) 


 





 


Sodium Level


 139 MMOL/L


(136-145) 143 MMOL/L


(136-145) 





 


Potassium Level


 4.0 MMOL/L


(3.5-5.1) 3.5 MMOL/L


(3.5-5.1) 





 


Chloride Level


 102 MMOL/L


() 105 MMOL/L


() 





 


Carbon Dioxide Level


 30 MMOL/L


(21-32) 33 MMOL/L


(21-32)  H 





 


Anion Gap


 7 mmol/L


(5-15) 5 mmol/L


(5-15) 





 


Blood Urea Nitrogen


 16 mg/dL


(7-18) 12 mg/dL


(7-18) 





 


Creatinine


 0.9 MG/DL


(0.55-1.30) 0.8 MG/DL


(0.55-1.30) 





 


Estimat Glomerular Filtration


Rate > 60 mL/min


(>60) > 60 mL/min


(>60) 





 


Glucose Level


 95 MG/DL


() 95 MG/DL


() 





 


Calcium Level


 8.9 MG/DL


(8.5-10.1) 8.6 MG/DL


(8.5-10.1) 





 


Total Bilirubin


 0.4 MG/DL


(0.2-1.0) 0.5 MG/DL


(0.2-1.0) 





 


Aspartate Amino Transf


(AST/SGOT) 30 U/L (15-37)


 26 U/L (15-37)


 





 


Alanine Aminotransferase


(ALT/SGPT) 28 U/L (12-78)


 23 U/L (12-78)


 





 


Alkaline Phosphatase


 118 U/L


()  H 100 U/L


() 





 


Total Protein


 6.4 G/DL


(6.4-8.2) 6.0 G/DL


(6.4-8.2)  L 





 


Albumin


 3.7 G/DL


(3.4-5.0) 3.4 G/DL


(3.4-5.0) 





 


Globulin 2.7 g/dL   2.6 g/dL   


 


Albumin/Globulin Ratio 1.4 (1.0-2.7)   1.3 (1.0-2.7)   


 


Lipase


 108 U/L


() 


 





 


Phosphorus Level


 


 3.5 MG/DL


(2.5-4.9) 





 


Magnesium Level


 


 1.9 MG/DL


(1.8-2.4) 





 


POC Whole Blood Glucose   Pending  








Height (Feet):  5


Height (Inches):  1.00


Weight (Pounds):  202





Assessment/Plan


Problem List:  


(1) Back Pain Exacerbation


(2) Acute exacerbation of chronic low back pain


ICD Codes:  M54.5 - Low back pain; G89.29 - Other chronic pain


SNOMED:  024183734


(3) Constipation


ICD Codes:  K59.00 - Constipation, unspecified


SNOMED:  96365974


(4) Gastroenteritis


ICD Codes:  K52.9 - Noninfective gastroenteritis and colitis, unspecified


SNOMED:  99659301


(5) Hepatic steatosis


ICD Codes:  K76.0 - Fatty (change of) liver, not elsewhere classified


SNOMED:  607545439


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


ICD Codes:  M54.31 - Sciatica, right side


SNOMED:  87361007


(7) Small bowel obstruction


Assessment & Plan:  CT reviewed


exam benign


passing flatus


not obstructed


outpatient colonoscopy planned


okay to d/c


thank you


  Liver: Mild steatosis.


  Gallbladder and bile ducts:  Unremarkable.


  Pancreas:  Unremarkable.


  Spleen:  Unremarkable.


  Adrenals:  Unremarkable.


  Kidneys and ureters:  No hydronephrosis.  Cysts.  Punctate 


nonobstructive stone in the right kidney.


  Stomach and bowel:  No bowel obstruction. No bowel wall thickening.  


Mild stool burden.  Some prominent mildly distended fluid-filled small 


bowel loops in the left abdomen.


 


 PELVIS:


  Appendix:  No evidence of appendicitis.


  Bladder:  Unremarkable.


  Reproductive:  Unremarkable.


 


 ABDOMEN and PELVIS:


  Intraperitoneal space:  Unremarkable.


  Bones/joints:  No acute fractures.  Posterior fusion.  Dextroscoliosis.


  Soft tissues:  Unremarkable.


  Vasculature:  No abdominal aortic aneurysm.


  Lymph nodes:  No enlarged lymph nodes.


 


IMPRESSION:     


 


1. Some prominent mildly distended fluid-filled small bowel loops in the 


left abdomen.  Remaining small bowel loops are unremarkable.  May 


represent gastroenteritis versus very early partial small bowel 


obstruction.


 


2.  Mild stool burden throughout the colon.


 


3.  Mild hepatic steatosis.


4.  Nonobstructive stone in the right kidney.


ICD Codes:  K56.609 - Unspecified intestinal obstruction, unspecified as to 

partial versus complete obstruction


SNOMED:  319383005











Otis Shannon                 2020 16:53

## 2020-11-06 NOTE — DIAGNOSTIC IMAGING REPORT
Indication: Abdominal pain

 

Technique: Supine view of the abdomen

 

Comparison: Abdomen pelvis CT 11/5/2020. No comparison plain radiographs

 

Findings: Body habitus somewhat limits evaluation. Focally dilated small bowel loop

seen in the left upper quadrant on previous CT scan are not evident currently. Bowel

gas pattern is currently unremarkable. Again demonstrated is evidence of prior lower

lumbar spine fusion surgery.

 

Impression: Focally dilated small bowel loops on previous CT scan are no longer

evident, may reflect decreasing ileus or resolved partial small bowel obstruction

## 2020-11-09 NOTE — DISCHARGE SUMMARY
Discharge Summary


Discharge Summary


_


DATE OF ADMISSION: 2020


DATE OF DISCHARGE: 2020





ADMITTING MD: Dr. Jonn Schaefer





DISCHARGED BY:  Dr. Anastacia Talley





CONSULTANTS: 


Dr. Anastacia Shannon





BRIEF HOSPITAL COURSE:


The patient is a 65-year-old delightful  female with past medical 

history significant for chronic abdominal pain, with history of chronic 

constipation, low back pain, gastroenteritis, hepatic steatosis, sciatica of r

ight side associated with lumbar disc disease and small bowel obstruction in the

past, who presented to the hospital complaining of left lower quadrant abdominal

pain for several months.  The patient stated pain is 10/10 in intensity, 

progressively worsening and somewhat aggravated with motion.  She denied any 

nausea, vomiting or diarrhea, however patient has been having constipation.  She

had a history of  in the past as well as left ovarian resection.





Upon evaluation at the ED vital signs were stable.  Blood work didn't show any 

leukocytosis.  Hemoglobin and hematocrit were stable.  Urinalysis was negative. 

Electrolytes were normal.  LFTs were normal.  CT of the abdomen showed early 

small bowel obstruction versus gastroenteritis.  Incidentally, also found 

hepatic steatosis, nonobstructive kidney disease stone and gastroenteritis.  No 

diverticulosis or diverticulitis.  Patient continued to be nauseous and 

symptomatic in ED.  She was continued on n.p.o. status.  She was given IV fluids

and pain control.  She was then admitted for further evaluation.





Patient was admitted to medical floor.  She was given IV hydration.  She u

nderwent surgical and GI evaluation.  Patient was able to pass gas.  Examination

was benign.  Patient is not obstructed.  Diet was started.  She was recommended 

outpatient colonoscopy.





She was cleared for discharge home.








FINAL DIAGNOSES: 


Abdominal pain, possibly due to small bowel obstruction


Chronic constipation


Morbid obesity


Back pain


Hepatic steatosis





DISPOSITION: Patient was discharged home.





DISCHARGE MEDICATIONS: Refer to Discharge Medication List.





DISCHARGE INSTRUCTIONS: Follow-up in a week.








I have been assigned to complete a discharge summary on this account, I was not 

involved with the patient's management.--JCARLOS Lozoya Jacqueline Robles NP    2020 01:14